# Patient Record
Sex: MALE | Race: ASIAN | NOT HISPANIC OR LATINO | URBAN - METROPOLITAN AREA
[De-identification: names, ages, dates, MRNs, and addresses within clinical notes are randomized per-mention and may not be internally consistent; named-entity substitution may affect disease eponyms.]

---

## 2017-03-01 RX ORDER — CHLORHEXIDINE GLUCONATE 213 G/1000ML
1 SOLUTION TOPICAL ONCE
Qty: 0 | Refills: 0 | Status: DISCONTINUED | OUTPATIENT
Start: 2017-03-02 | End: 2017-03-03

## 2017-03-01 NOTE — H&P ADULT - NSHPREVIEWOFSYSTEMS_GEN_ALL_CORE
GENERAL, CONSTITUTIONAL : denies recent weight loss, Fever, Chills  EYES, VISION: denies changes in vision   EARS, NOSE, THROAT: denies hearing loss  HEART, CARDIOVASCULAR: +chest pain/HEREDIA, denies arrhythmia, palpitations,  LE edema, claudication.   RESPIRATORY: +HEREDIA, Denies cough or wheezing  GASTROINTESTINAL: Endorses intermittent constipation. Denies abdominal pain, heartburn, bloody stool.   GENITOURINARY: Denies frequent urination, urgency  MUSCULOSKELETAL denies joint pain or swelling, restricted motion, musculoskeletal pain.   SKIN & INTEGUMENTARY Denies rashes, sores, blisters, blisters, growths.  NEUROLOGICAL: Denies numbness or tingling sensations, sensation loss, burning.   PSYCHIATRIC: Denies nervousness, anxiety, depression  ENDOCRINE Denies heat or cold intolerance, excessive thirst  HEMATOLOGIC/LYMPHATIC: Denies abnormal bleeding, bleeding of any kind

## 2017-03-01 NOTE — H&P ADULT - NSHPPHYSICALEXAM_GEN_ALL_CORE
HR: --77  BP: --115/78  RR: --18  SpO2: --95%  Temp:  Height: 5' 4"  Wt(kg): --144lbs     Appearance: Normal	  HEENT:   Normal oral mucosa, PERRL, EOMI	  Neck: Supple, No JVD; No Carotid Bruit and 2+ pulses B/L  Cardiovascular: Normal S1 S2, No JVD, No murmurs  Respiratory: Lungs clear to auscultation No Rales, Rhonchi, Wheezing	  Gastrointestinal:  + BS X 4Q, Soft, Non-tender, non distended  	  Skin: No rashes, No ecchymoses, No cyanosis  Extremities: Normal range of motion, No clubbing, cyanosis or edema  Vascular: Femoral pulses 2+ b/l without bruit, DP 1+ b/l, PT 1+ b/l  Neurologic: Non-focal  Psychiatry: A & O x 3, Mood & affect appropriate

## 2017-03-01 NOTE — H&P ADULT - ASSESSMENT
56 yr old M current smoker, with FHx of heart disease, PMHx of dyslipidemia, DM, MI in 2004 s/p PCI@ [facility] (report unavailable) who presents for recommended Cardiac Cath with possible intervention if clinically indicated secondary to CCS Anginal Class 3 Equivalent Symptoms in the setting of an abnormal Stress Echo.       ASA: III and Mallampati: III  ***OF NOTE: 56 yr old M current smoker, with FHx of heart disease, PMHx of dyslipidemia, DM, MI in 2004 s/p PCI@ [facility] (report unavailable) who presents for recommended Cardiac Cath with possible intervention if clinically indicated secondary to CCS Anginal Class 3 Equivalent Symptoms in the setting of an abnormal Stress Echo.       ASA: III and Mallampati: III  ***OF NOTE: Pt was loaded with ASA 325mg PO X 1 and Plavix 600mg PO X 1  prior to procedure.

## 2017-03-01 NOTE — H&P ADULT - HISTORY OF PRESENT ILLNESS
**will bring in medications, please confirm dosages**      56 yr old M current smoker, with FHx of heart disease, PMHx of dyslipidemia, DM, MI in 2004 s/p PCI@ [facility] (report unavailable) who initially presented to cardiologist c/o exertional chest pain x several months. Patient describes it as being midsternal chest pressure with radiation to jaw, 6/10 in severity. Symptoms typically brought on by walking >2 blocks at a fast pace, improved within a few minutes of rest. Patient denies any SOB, palpitations, dizziness, recent PND, orthopnea or LE edema.  Stress Echocardiogram on 2/4/17 revealed abnormal LV contractility and augmentation involving the go-septal walls of the lV with overt evidence of ischemia to the HR achieved. EF:50%. In light of patients risk factors, CCS Angina Class III equivalent symptoms and abnormal Stress echo, patient now presents for recommended cardiac catheterization with possible intervention. 56 yr old M current smoker, with FHx of heart disease, PMHx of dyslipidemia, DM, MI in 2004 s/p PCI@ [facility] (report unavailable) who initially presented to cardiologist c/o exertional chest pain x several months. Patient describes it as being midsternal chest pressure with radiation to jaw, 6/10 in severity. Symptoms typically brought on by walking >2 blocks at a fast pace, improved within a few minutes of rest. Patient denies any SOB, palpitations, dizziness, recent PND, orthopnea or LE edema.  Stress Echocardiogram on 2/4/17 revealed abnormal LV contractility and augmentation involving the go-septal walls of the lV with overt evidence of ischemia to the HR achieved. EF:50%. In light of patients risk factors, CCS Angina Class III equivalent symptoms and abnormal Stress echo, patient now presents for recommended cardiac catheterization with possible intervention.

## 2017-03-02 ENCOUNTER — INPATIENT (INPATIENT)
Facility: HOSPITAL | Age: 57
LOS: 0 days | Discharge: HOME | DRG: 247 | End: 2017-03-03
Attending: INTERNAL MEDICINE | Admitting: INTERNAL MEDICINE
Payer: COMMERCIAL

## 2017-03-02 VITALS
RESPIRATION RATE: 16 BRPM | HEART RATE: 68 BPM | DIASTOLIC BLOOD PRESSURE: 88 MMHG | SYSTOLIC BLOOD PRESSURE: 128 MMHG | OXYGEN SATURATION: 98 %

## 2017-03-02 DIAGNOSIS — Z98.890 OTHER SPECIFIED POSTPROCEDURAL STATES: Chronic | ICD-10-CM

## 2017-03-02 LAB
ANION GAP SERPL CALC-SCNC: 8 MMOL/L — LOW (ref 9–16)
APTT BLD: 34.4 SEC — SIGNIFICANT CHANGE UP (ref 27.5–37.4)
BASOPHILS NFR BLD AUTO: 1.6 % — SIGNIFICANT CHANGE UP (ref 0–2)
BUN SERPL-MCNC: 13 MG/DL — SIGNIFICANT CHANGE UP (ref 7–23)
CALCIUM SERPL-MCNC: 9.2 MG/DL — SIGNIFICANT CHANGE UP (ref 8.5–10.5)
CHLORIDE SERPL-SCNC: 106 MMOL/L — SIGNIFICANT CHANGE UP (ref 96–108)
CHOLEST SERPL-MCNC: 169 MG/DL — SIGNIFICANT CHANGE UP
CK MB CFR SERPL CALC: <1 NG/ML — SIGNIFICANT CHANGE UP (ref 0.5–3.6)
CK SERPL-CCNC: 110 U/L — SIGNIFICANT CHANGE UP (ref 39–308)
CO2 SERPL-SCNC: 25 MMOL/L — SIGNIFICANT CHANGE UP (ref 22–31)
CREAT SERPL-MCNC: 1.01 MG/DL — SIGNIFICANT CHANGE UP (ref 0.5–1.3)
CRP SERPL-MCNC: <0.29 MG/DL — SIGNIFICANT CHANGE UP
EOSINOPHIL NFR BLD AUTO: 5.5 % — SIGNIFICANT CHANGE UP (ref 0–6)
GLUCOSE SERPL-MCNC: 215 MG/DL — HIGH (ref 70–99)
HBA1C BLD-MCNC: 7.7 % — HIGH (ref 4.8–5.6)
HCT VFR BLD CALC: 46.5 % — SIGNIFICANT CHANGE UP (ref 39–50)
HDLC SERPL-MCNC: 33 MG/DL — LOW
HGB BLD-MCNC: 16.2 G/DL — SIGNIFICANT CHANGE UP (ref 13–17)
INR BLD: 1.12 — SIGNIFICANT CHANGE UP (ref 0.88–1.16)
LIPID PNL WITH DIRECT LDL SERPL: 92 MG/DL — SIGNIFICANT CHANGE UP
LYMPHOCYTES # BLD AUTO: 32.9 % — SIGNIFICANT CHANGE UP (ref 13–44)
MCHC RBC-ENTMCNC: 29.1 PG — SIGNIFICANT CHANGE UP (ref 27–34)
MCHC RBC-ENTMCNC: 34.8 G/DL — SIGNIFICANT CHANGE UP (ref 32–36)
MCV RBC AUTO: 83.5 FL — SIGNIFICANT CHANGE UP (ref 80–100)
MONOCYTES NFR BLD AUTO: 7.5 % — SIGNIFICANT CHANGE UP (ref 2–14)
NEUTROPHILS NFR BLD AUTO: 52.5 % — SIGNIFICANT CHANGE UP (ref 43–77)
PLATELET # BLD AUTO: 207 K/UL — SIGNIFICANT CHANGE UP (ref 150–400)
POTASSIUM SERPL-MCNC: 4.1 MMOL/L — SIGNIFICANT CHANGE UP (ref 3.5–5.3)
POTASSIUM SERPL-SCNC: 4.1 MMOL/L — SIGNIFICANT CHANGE UP (ref 3.5–5.3)
PROTHROM AB SERPL-ACNC: 12.4 SEC — SIGNIFICANT CHANGE UP (ref 10–13.1)
RBC # BLD: 5.57 M/UL — SIGNIFICANT CHANGE UP (ref 4.2–5.8)
RBC # FLD: 12.8 % — SIGNIFICANT CHANGE UP (ref 10.3–16.9)
SODIUM SERPL-SCNC: 139 MMOL/L — SIGNIFICANT CHANGE UP (ref 135–145)
TOTAL CHOLESTEROL/HDL RATIO MEASUREMENT: 5.1 RATIO — HIGH
TRIGL SERPL-MCNC: 222 MG/DL — HIGH
WBC # BLD: 8.5 K/UL — SIGNIFICANT CHANGE UP (ref 3.8–10.5)
WBC # FLD AUTO: 8.5 K/UL — SIGNIFICANT CHANGE UP (ref 3.8–10.5)

## 2017-03-02 PROCEDURE — 92921: CPT | Mod: RC

## 2017-03-02 PROCEDURE — 93010 ELECTROCARDIOGRAM REPORT: CPT

## 2017-03-02 PROCEDURE — 92928 PRQ TCAT PLMT NTRAC ST 1 LES: CPT | Mod: RC

## 2017-03-02 PROCEDURE — 93454 CORONARY ARTERY ANGIO S&I: CPT | Mod: 26,XU

## 2017-03-02 PROCEDURE — 92929: CPT | Mod: RC

## 2017-03-02 RX ORDER — CLOPIDOGREL BISULFATE 75 MG/1
75 TABLET, FILM COATED ORAL DAILY
Qty: 0 | Refills: 0 | Status: DISCONTINUED | OUTPATIENT
Start: 2017-03-03 | End: 2017-03-03

## 2017-03-02 RX ORDER — ASPIRIN/CALCIUM CARB/MAGNESIUM 324 MG
81 TABLET ORAL DAILY
Qty: 0 | Refills: 0 | Status: DISCONTINUED | OUTPATIENT
Start: 2017-03-03 | End: 2017-03-03

## 2017-03-02 RX ORDER — DEXTROSE 50 % IN WATER 50 %
1 SYRINGE (ML) INTRAVENOUS ONCE
Qty: 0 | Refills: 0 | Status: DISCONTINUED | OUTPATIENT
Start: 2017-03-02 | End: 2017-03-03

## 2017-03-02 RX ORDER — CLOPIDOGREL BISULFATE 75 MG/1
600 TABLET, FILM COATED ORAL ONCE
Qty: 0 | Refills: 0 | Status: COMPLETED | OUTPATIENT
Start: 2017-03-02 | End: 2017-03-02

## 2017-03-02 RX ORDER — DEXTROSE 50 % IN WATER 50 %
25 SYRINGE (ML) INTRAVENOUS ONCE
Qty: 0 | Refills: 0 | Status: DISCONTINUED | OUTPATIENT
Start: 2017-03-02 | End: 2017-03-03

## 2017-03-02 RX ORDER — DEXTROSE 50 % IN WATER 50 %
25 SYRINGE (ML) INTRAVENOUS ONCE
Qty: 0 | Refills: 0 | Status: DISCONTINUED | OUTPATIENT
Start: 2017-03-02 | End: 2017-03-02

## 2017-03-02 RX ORDER — INSULIN LISPRO 100/ML
VIAL (ML) SUBCUTANEOUS ONCE
Qty: 0 | Refills: 0 | Status: DISCONTINUED | OUTPATIENT
Start: 2017-03-02 | End: 2017-03-02

## 2017-03-02 RX ORDER — INSULIN LISPRO 100/ML
VIAL (ML) SUBCUTANEOUS
Qty: 0 | Refills: 0 | Status: DISCONTINUED | OUTPATIENT
Start: 2017-03-02 | End: 2017-03-03

## 2017-03-02 RX ORDER — SODIUM CHLORIDE 9 MG/ML
1000 INJECTION, SOLUTION INTRAVENOUS
Qty: 0 | Refills: 0 | Status: DISCONTINUED | OUTPATIENT
Start: 2017-03-02 | End: 2017-03-02

## 2017-03-02 RX ORDER — GLUCAGON INJECTION, SOLUTION 0.5 MG/.1ML
1 INJECTION, SOLUTION SUBCUTANEOUS ONCE
Qty: 0 | Refills: 0 | Status: DISCONTINUED | OUTPATIENT
Start: 2017-03-02 | End: 2017-03-03

## 2017-03-02 RX ORDER — GLUCAGON INJECTION, SOLUTION 0.5 MG/.1ML
1 INJECTION, SOLUTION SUBCUTANEOUS ONCE
Qty: 0 | Refills: 0 | Status: DISCONTINUED | OUTPATIENT
Start: 2017-03-02 | End: 2017-03-02

## 2017-03-02 RX ORDER — SODIUM CHLORIDE 9 MG/ML
500 INJECTION INTRAMUSCULAR; INTRAVENOUS; SUBCUTANEOUS
Qty: 0 | Refills: 0 | Status: DISCONTINUED | OUTPATIENT
Start: 2017-03-02 | End: 2017-03-02

## 2017-03-02 RX ORDER — SIMVASTATIN 20 MG/1
1 TABLET, FILM COATED ORAL
Qty: 0 | Refills: 0 | COMMUNITY

## 2017-03-02 RX ORDER — SODIUM CHLORIDE 9 MG/ML
1000 INJECTION, SOLUTION INTRAVENOUS
Qty: 0 | Refills: 0 | Status: DISCONTINUED | OUTPATIENT
Start: 2017-03-02 | End: 2017-03-03

## 2017-03-02 RX ORDER — DEXTROSE 50 % IN WATER 50 %
12.5 SYRINGE (ML) INTRAVENOUS ONCE
Qty: 0 | Refills: 0 | Status: DISCONTINUED | OUTPATIENT
Start: 2017-03-02 | End: 2017-03-03

## 2017-03-02 RX ORDER — ASPIRIN/CALCIUM CARB/MAGNESIUM 324 MG
325 TABLET ORAL ONCE
Qty: 0 | Refills: 0 | Status: COMPLETED | OUTPATIENT
Start: 2017-03-02 | End: 2017-03-02

## 2017-03-02 RX ORDER — SIMVASTATIN 20 MG/1
40 TABLET, FILM COATED ORAL DAILY
Qty: 0 | Refills: 0 | Status: DISCONTINUED | OUTPATIENT
Start: 2017-03-02 | End: 2017-03-02

## 2017-03-02 RX ORDER — DEXTROSE 50 % IN WATER 50 %
12.5 SYRINGE (ML) INTRAVENOUS ONCE
Qty: 0 | Refills: 0 | Status: DISCONTINUED | OUTPATIENT
Start: 2017-03-02 | End: 2017-03-02

## 2017-03-02 RX ORDER — DEXTROSE 50 % IN WATER 50 %
1 SYRINGE (ML) INTRAVENOUS ONCE
Qty: 0 | Refills: 0 | Status: DISCONTINUED | OUTPATIENT
Start: 2017-03-02 | End: 2017-03-02

## 2017-03-02 RX ADMIN — SODIUM CHLORIDE 75 MILLILITER(S): 9 INJECTION INTRAMUSCULAR; INTRAVENOUS; SUBCUTANEOUS at 10:33

## 2017-03-02 RX ADMIN — Medication 325 MILLIGRAM(S): at 10:29

## 2017-03-02 RX ADMIN — Medication 1: at 21:19

## 2017-03-02 RX ADMIN — CLOPIDOGREL BISULFATE 600 MILLIGRAM(S): 75 TABLET, FILM COATED ORAL at 10:29

## 2017-03-02 NOTE — CONSULT NOTE ADULT - SUBJECTIVE AND OBJECTIVE BOX
CHIEF COMPLAINT: CAD    HISTORY OF PRESENT ILLNESS:  56 yr old M current smoker, with FHx of heart disease, PMHx of dyslipidemia, DM, MI in 2004 s/p PCI@ [facility] (report unavailable) who initially presented to cardiologist c/o exertional chest pain x several months. Patient describes it as being midsternal chest pressure with radiation to jaw, 6/10 in severity. Symptoms typically brought on by walking >2 blocks at a fast pace, improved within a few minutes of rest. Patient denies any SOB, palpitations, dizziness, recent PND, orthopnea or LE edema.  On 3/2/17 patient had a PCI with GRIS x 2 to his d/RCA/RPDA.     PAST MEDICAL & SURGICAL HISTORY:  Coronary artery disease  Diabetes  Dyslipidemia  S/P right knee arthroscopy  History of percutaneous coronary intervention    FAMILY HISTORY:   Family history of heart disease (Father)    ALLERGIES: NKDA    HOME MEDICATIONS:  · 	Aspirin Enteric Coated 325 mg oral delayed release tablet: Last Dose Taken:  , 1 tab(s) orally once a day  · 	metFORMIN 1000 mg oral tablet, extended release: Last Dose Taken:  , 1 tab(s) orally once a day  · 	simvastatin 40 mg oral tablet: Last Dose Taken:  , 1 tab(s) orally once a day (at bedtime)    REVIEW OF SYSTEMS:  CONSTITUTIONAL: No fever, weight loss, or fatigue  NEUROLOGICAL: No headaches, memory loss, loss of strength, numbness, or tremors  PSYCHIATRIC: No depression, anxiety, mood swings, or difficulty sleeping  RESPIRATORY: No cough, wheezing, chills or hemoptysis; No Shortness of Breath  CARDIOVASCULAR: No chest pain, palpitations, passing out, dizziness, or leg swelling  GASTROINTESTINAL: No abdominal or epigastric pain. No nausea, vomiting, or hematemesis; No diarrhea or constipation. No melena or hematochezia.  SKIN: No itching, burning, rashes, or lesions   MUSCULOSKELETAL: right wrist soreness over radial band	    PHYSICAL EXAM:  Height (cm): 162.5 (03-02 @ 10:27)  Weight (kg): 65.3 (03-02 @ 10:27)  BMI (kg/m2): 24.7 (03-02 @ 10:27)  HR: --77  BP: --115/78  RR: --18  SpO2: --95%    Appearance: Normal	  Neurologic: Non-focal  Psychiatric: AxOx3, normal mood and affect  HEENT:   Normal oral mucosa, PERRL, EOMI	  Lymphatic: No lymphadenopathy  Cardiovascular: Normal S1 S2, No JVD, No murmurs, No edema  Respiratory: Lungs clear to auscultation	  Gastrointestinal:  Soft, Non-tender, + BS	  Skin: No rashes, No ecchymoses, No cyanosis	  Extremities: Normal range of motion, No clubbing, cyanosis or edema  Vascular: Peripheral pulses palpable 2+ bilaterally  	  LABS:                        16.2   8.5   )-----------( 207      ( 02 Mar 2017 09:07 )             46.5     02 Mar 2017 09:07    139    |  106    |  13     ----------------------------<  215    4.1     |  25     |  1.01     Ca    9.2        02 Mar 2017 09:07        Hemoglobin A1C, Whole Blood: 7.7 % (03-02 @ 09:07)      Cholesterol, Serum: 169 mg/dL (03-02 @ 09:07)  HDL Cholesterol, Serum: 33 mg/dL (03-02 @ 09:07)  Triglycerides, Serum: 222 mg/dL (03-02 @ 09:07)  Direct LDL: 92 mg/dL (03-02 @ 09:07)    C-Reactive Protein, Serum: <0.29 mg/dL (03-02 @ 09:07)      10 "S" ASSESSMENT PLAN: SMOKING, SITTING, SUGAR, SALT, SOME FATS, SOCIAL, SLEEP, SIGNS, AND MEDS:  Tobacco usage: Current every day smoker. He has smoked 1/2 ppd for about 30 years. 15 pack year history.   Stress: Rates his stress as fairly well-controlled.   ETOH: He occasionally drinks one beer.   Caffeine: He usually drinks 2-3 cups of tea per day and several cups of coffee. He adds whole milk.   Hormone Replacement: Denies.   Sleep Disorder: + snoring. He has been tired lately. He reports having had a home sleep study but never followed up on the results.   Inflammatory Condition: Denies.   Activity Level: He is active on his job working for [employer], but he does not exercise outside of work.   Current Diet: Breakfast) whole wheat bread, crackers with peanut butter or eggs and fruit. Lunch) often the same as breakfast or just fruit. Dinner) Rice and chicken jin or spaghetti or other pasta and sauce, or fish jin with naan or roti. Other) fruit for dessert and snacks  Heart Failure: Denies.   Myopathy with Statins: Denies.   GI/ Issues: + GERD, taking Nexium, he denies having seen a GI specialist.     ASSESSMENT/RECOMMENDATIONS: 	  Summary: 56 yr old M current smoker, with FHx of heart disease, PMHx of dyslipidemia, DM, MI in 2004 s/p PCI@ [facility] (report unavailable) who initially presented to cardiologist c/o exertional chest pain x several months. Patient describes it as being midsternal chest pressure with radiation to jaw, 6/10 in severity. Symptoms typically brought on by walking >2 blocks at a fast pace, improved within a few minutes of rest. Patient denies any SOB, palpitations, dizziness, recent PND, orthopnea or LE edema.  On 3/2/17 patient had a PCI with GRIS x 2 to his d/RCA/RPDA.     RECOMMENDATIONS:   Anti-platelet Therapy: DAPT per interventionalist recommendation.   Lipid Therapy: Consider switching the patient from a moderate-dose statin (simvastatin 40mg/d) to a high-dose statin with Lipitor 40mg/d.   Beta Blocker Therapy: Per your discretion.   ACE/ARB Therapy: Consider ACE/ARB therapy for renal protection in this diabetic patient.    Diet: Low-sodium, low-carbohydrate, Mediterranean diet. Written instruction on the Mediterranean diet was provided.   Exercise: 30-45 minutes most days of the week once cleared to do so by their referring cardiologist.   Smoking: Smoking cessation encouraged. We discussed cessation methods. He was given written materials and resources as well.   Stress Management: Instruction on mindfulness meditation was provided.   Sleep: Clinical evidence is suspicious for sleep apnea. Follow-up on home sleep test that was completed is suggested.   Other: Referral to gastroenterology might benefit this patient with h/o GERD and PPI use.     Thank you for the opportunity to see this patient. Please feel free to contact [facility] if there are any questions, or if you feel that your patient would benefit from continued follow-up visits with the Program.

## 2017-03-03 ENCOUNTER — TRANSCRIPTION ENCOUNTER (OUTPATIENT)
Age: 57
End: 2017-03-03

## 2017-03-03 VITALS — TEMPERATURE: 97 F

## 2017-03-03 LAB
ANION GAP SERPL CALC-SCNC: 7 MMOL/L — LOW (ref 9–16)
BUN SERPL-MCNC: 12 MG/DL — SIGNIFICANT CHANGE UP (ref 7–23)
CALCIUM SERPL-MCNC: 8.4 MG/DL — LOW (ref 8.5–10.5)
CHLORIDE SERPL-SCNC: 108 MMOL/L — SIGNIFICANT CHANGE UP (ref 96–108)
CO2 SERPL-SCNC: 24 MMOL/L — SIGNIFICANT CHANGE UP (ref 22–31)
CREAT SERPL-MCNC: 0.87 MG/DL — SIGNIFICANT CHANGE UP (ref 0.5–1.3)
GLUCOSE SERPL-MCNC: 148 MG/DL — HIGH (ref 70–99)
HCT VFR BLD CALC: 43.5 % — SIGNIFICANT CHANGE UP (ref 39–50)
HGB BLD-MCNC: 15 G/DL — SIGNIFICANT CHANGE UP (ref 13–17)
MAGNESIUM SERPL-MCNC: 2.1 MG/DL — SIGNIFICANT CHANGE UP (ref 1.6–2.4)
MCHC RBC-ENTMCNC: 29.5 PG — SIGNIFICANT CHANGE UP (ref 27–34)
MCHC RBC-ENTMCNC: 34.5 G/DL — SIGNIFICANT CHANGE UP (ref 32–36)
MCV RBC AUTO: 85.5 FL — SIGNIFICANT CHANGE UP (ref 80–100)
PLATELET # BLD AUTO: 168 K/UL — SIGNIFICANT CHANGE UP (ref 150–400)
POTASSIUM SERPL-MCNC: 4.1 MMOL/L — SIGNIFICANT CHANGE UP (ref 3.5–5.3)
POTASSIUM SERPL-SCNC: 4.1 MMOL/L — SIGNIFICANT CHANGE UP (ref 3.5–5.3)
RBC # BLD: 5.09 M/UL — SIGNIFICANT CHANGE UP (ref 4.2–5.8)
RBC # FLD: 13 % — SIGNIFICANT CHANGE UP (ref 10.3–16.9)
SODIUM SERPL-SCNC: 139 MMOL/L — SIGNIFICANT CHANGE UP (ref 135–145)
WBC # BLD: 8.5 K/UL — SIGNIFICANT CHANGE UP (ref 3.8–10.5)
WBC # FLD AUTO: 8.5 K/UL — SIGNIFICANT CHANGE UP (ref 3.8–10.5)

## 2017-03-03 PROCEDURE — 85610 PROTHROMBIN TIME: CPT

## 2017-03-03 PROCEDURE — 85730 THROMBOPLASTIN TIME PARTIAL: CPT

## 2017-03-03 PROCEDURE — 80048 BASIC METABOLIC PNL TOTAL CA: CPT

## 2017-03-03 PROCEDURE — 82553 CREATINE MB FRACTION: CPT

## 2017-03-03 PROCEDURE — C1725: CPT

## 2017-03-03 PROCEDURE — C1769: CPT

## 2017-03-03 PROCEDURE — 85025 COMPLETE CBC W/AUTO DIFF WBC: CPT

## 2017-03-03 PROCEDURE — C1874: CPT

## 2017-03-03 PROCEDURE — 83036 HEMOGLOBIN GLYCOSYLATED A1C: CPT

## 2017-03-03 PROCEDURE — 36415 COLL VENOUS BLD VENIPUNCTURE: CPT

## 2017-03-03 PROCEDURE — 85027 COMPLETE CBC AUTOMATED: CPT

## 2017-03-03 PROCEDURE — 86140 C-REACTIVE PROTEIN: CPT

## 2017-03-03 PROCEDURE — C1887: CPT

## 2017-03-03 PROCEDURE — 82550 ASSAY OF CK (CPK): CPT

## 2017-03-03 PROCEDURE — 83735 ASSAY OF MAGNESIUM: CPT

## 2017-03-03 PROCEDURE — 80061 LIPID PANEL: CPT

## 2017-03-03 PROCEDURE — 93005 ELECTROCARDIOGRAM TRACING: CPT

## 2017-03-03 RX ORDER — ASPIRIN/CALCIUM CARB/MAGNESIUM 324 MG
1 TABLET ORAL
Qty: 30 | Refills: 11 | OUTPATIENT
Start: 2017-03-03 | End: 2018-02-25

## 2017-03-03 RX ORDER — ASPIRIN/CALCIUM CARB/MAGNESIUM 324 MG
1 TABLET ORAL
Qty: 0 | Refills: 0 | COMMUNITY

## 2017-03-03 RX ORDER — CLOPIDOGREL BISULFATE 75 MG/1
1 TABLET, FILM COATED ORAL
Qty: 30 | Refills: 11 | OUTPATIENT
Start: 2017-03-03 | End: 2018-02-25

## 2017-03-03 RX ORDER — SIMVASTATIN 20 MG/1
1 TABLET, FILM COATED ORAL
Qty: 0 | Refills: 0 | COMMUNITY

## 2017-03-03 RX ORDER — ATORVASTATIN CALCIUM 80 MG/1
1 TABLET, FILM COATED ORAL
Qty: 30 | Refills: 3 | OUTPATIENT
Start: 2017-03-03 | End: 2017-06-30

## 2017-03-03 RX ORDER — ATORVASTATIN CALCIUM 80 MG/1
40 TABLET, FILM COATED ORAL AT BEDTIME
Qty: 0 | Refills: 0 | Status: DISCONTINUED | OUTPATIENT
Start: 2017-03-03 | End: 2017-03-03

## 2017-03-03 RX ADMIN — CLOPIDOGREL BISULFATE 75 MILLIGRAM(S): 75 TABLET, FILM COATED ORAL at 12:31

## 2017-03-03 RX ADMIN — Medication 2: at 08:35

## 2017-03-03 RX ADMIN — Medication 81 MILLIGRAM(S): at 12:31

## 2017-03-03 RX ADMIN — Medication 1: at 12:36

## 2017-03-03 NOTE — DISCHARGE NOTE ADULT - PLAN OF CARE
The procedure was done through your right wrist. Please monitor for any signs of bleeding, blood collection under the skin, extreme pain or any signs of infection. If you experience any of these symptoms, please call your doctor or go to the nearest ER.   You can shower but no baths for 5 days.   No strenuous activities or heavy lifting for at least 5 days. You received two stents to two of your heart arteries. Continue Aspirin EC 81mg daily and Plavix 75mg once a daily. DO NOT STOP THESE MEDICATIONS UNLESS OTHERWISE INSTRUCTED BY YOUR CARDIOLOGIST. IF YOU STOP ANY OF THESE MEDICATIONS your stent can close and you can have a heart attack. STOP SIMVASTATIN. Start Atorvastatin (LIPITOR) 40mg once a day at bedtime. Check your liver function in 1 month.   Check your lipid panel (don't eat before test) in 1 month. STOP AND RESTART METFORMIN ON SUNDAY MARCH 5TH because you received contrast dye during the procedure. Monitor your fingerstick. Avoid concentrated sweets.   If your fingerstick is greater than 200 please call your doctor immediately or go to the nearest ER.

## 2017-03-03 NOTE — DISCHARGE NOTE ADULT - CARE PROVIDER_API CALL
Diane Jhaveri), Cardiovascular Disease; Internal Medicine  [address] [place] [address]  Phone: [phone]  Fax: [phone]

## 2017-03-03 NOTE — DISCHARGE NOTE ADULT - HOSPITAL COURSE
56 yr old M current smoker, with FHx of heart disease, PMHx of dyslipidemia, DM, MI in 2004 s/p PCI@ [facility] (report unavailable) who initially presented to cardiologist c/o exertional chest pain x several months. Stress Echocardiogram on 2/4/17 revealed abnormal LV contractility and augmentation involving the go-septal walls of the lV with overt evidence of ischemia to the HR achieved. EF:50%. In light of patients risk factors, CCS Angina Class III equivalent symptoms and abnormal Stress echo, patient presented to [facility] for recommended cardiac catheterization with possible intervention. Pt s/p PCI on 3/2 and received GRIS x 2 to distal RCA and RPDA and PTCA of RPLS. Revealed prox RCA 40%, distal RCA patent stent followed by 90% lesion, RPLS two tandem 70% lesion, RPDA 80% lesion, LM luminal irregularities, LCx luminal irregularities, LAD luminal irregularities, D1 medium to large 90% lesion. Recommended to return for staged PCI of D1 in 4-6 weeks. Pt to con't ASA/Plavix. Switched Zocor to lipitor 40mg QHS. Right wrist: no hematoma, NT, 2+ radial pulse. Labs reviewed and case discussed w/ Dr. Mendoza who deemed pt stable for discharge. Pt will f/u with Dr. Jhaveri in 1-2 weeks. Pt was given all discharge instructions and signed all paperwork.

## 2017-03-03 NOTE — DISCHARGE NOTE ADULT - MEDICATION SUMMARY - MEDICATIONS TO CHANGE
I will SWITCH the dose or number of times a day I take the medications listed below when I get home from the hospital:    Aspirin Enteric Coated 325 mg oral delayed release tablet  -- 1 tab(s) by mouth once a day

## 2017-03-03 NOTE — DISCHARGE NOTE ADULT - MEDICATION SUMMARY - MEDICATIONS TO TAKE
I will START or STAY ON the medications listed below when I get home from the hospital:    aspirin 81 mg oral delayed release tablet  -- 1 tab(s) by mouth once a day  -- Indication: For Coronary artery disease    metFORMIN 1000 mg oral tablet, extended release  -- 1 tab(s) by mouth once a day  -- Indication: For Diabetes. STOP AND RESTART ON SUNDAY MARCH 5TH     atorvastatin 40 mg oral tablet  -- 1 tab(s) by mouth once a day (at bedtime)  -- Indication: For High cholesterol     clopidogrel 75 mg oral tablet  -- 1 tab(s) by mouth once a day  -- Indication: For Coronary artery disease

## 2017-03-03 NOTE — DISCHARGE NOTE ADULT - CARE PLAN
Principal Discharge DX:	Coronary artery disease  Goal:	You received two stents to two of your heart arteries. Continue Aspirin EC 81mg daily and Plavix 75mg once a daily. DO NOT STOP THESE MEDICATIONS UNLESS OTHERWISE INSTRUCTED BY YOUR CARDIOLOGIST. IF YOU STOP ANY OF THESE MEDICATIONS your stent can close and you can have a heart attack.  Instructions for follow-up, activity and diet:	The procedure was done through your right wrist. Please monitor for any signs of bleeding, blood collection under the skin, extreme pain or any signs of infection. If you experience any of these symptoms, please call your doctor or go to the nearest ER.   You can shower but no baths for 5 days.   No strenuous activities or heavy lifting for at least 5 days.  Secondary Diagnosis:	Dyslipidemia  Goal:	STOP SIMVASTATIN. Start Atorvastatin (LIPITOR) 40mg once a day at bedtime.  Instructions for follow-up, activity and diet:	Check your liver function in 1 month.   Check your lipid panel (don't eat before test) in 1 month.  Secondary Diagnosis:	Diabetes  Goal:	STOP AND RESTART METFORMIN ON SUNDAY MARCH 5TH because you received contrast dye during the procedure. Monitor your fingerstick.  Instructions for follow-up, activity and diet:	Avoid concentrated sweets.   If your fingerstick is greater than 200 please call your doctor immediately or go to the nearest ER.

## 2017-03-03 NOTE — DISCHARGE NOTE ADULT - PATIENT PORTAL LINK FT
“You can access the [facility] Patient Portal, offered by [facility], by registering with the following website: [email or web address]”

## 2017-03-06 DIAGNOSIS — Z95.5 PRESENCE OF CORONARY ANGIOPLASTY IMPLANT AND GRAFT: ICD-10-CM

## 2017-03-06 DIAGNOSIS — Z28.21 IMMUNIZATION NOT CARRIED OUT BECAUSE OF PATIENT REFUSAL: ICD-10-CM

## 2017-03-06 DIAGNOSIS — E78.5 HYPERLIPIDEMIA, UNSPECIFIED: ICD-10-CM

## 2017-03-06 DIAGNOSIS — I25.10 ATHEROSCLEROTIC HEART DISEASE OF NATIVE CORONARY ARTERY WITHOUT ANGINA PECTORIS: ICD-10-CM

## 2017-03-06 DIAGNOSIS — F17.210 NICOTINE DEPENDENCE, CIGARETTES, UNCOMPLICATED: ICD-10-CM

## 2017-03-06 DIAGNOSIS — I25.2 OLD MYOCARDIAL INFARCTION: ICD-10-CM

## 2017-03-06 DIAGNOSIS — E11.59 TYPE 2 DIABETES MELLITUS WITH OTHER CIRCULATORY COMPLICATIONS: ICD-10-CM

## 2017-03-06 DIAGNOSIS — Z82.49 FAMILY HISTORY OF ISCHEMIC HEART DISEASE AND OTHER DISEASES OF THE CIRCULATORY SYSTEM: ICD-10-CM

## 2017-03-06 DIAGNOSIS — Z79.82 LONG TERM (CURRENT) USE OF ASPIRIN: ICD-10-CM

## 2017-03-21 PROBLEM — I25.10 ATHEROSCLEROTIC HEART DISEASE OF NATIVE CORONARY ARTERY WITHOUT ANGINA PECTORIS: Chronic | Status: ACTIVE | Noted: 2017-03-01

## 2017-03-21 PROBLEM — E11.9 TYPE 2 DIABETES MELLITUS WITHOUT COMPLICATIONS: Chronic | Status: ACTIVE | Noted: 2017-03-01

## 2017-03-21 PROBLEM — E78.5 HYPERLIPIDEMIA, UNSPECIFIED: Chronic | Status: ACTIVE | Noted: 2017-03-01

## 2017-04-04 VITALS
WEIGHT: 143.96 LBS | HEART RATE: 69 BPM | RESPIRATION RATE: 18 BRPM | DIASTOLIC BLOOD PRESSURE: 81 MMHG | SYSTOLIC BLOOD PRESSURE: 124 MMHG | TEMPERATURE: 96 F | HEIGHT: 64 IN | OXYGEN SATURATION: 99 %

## 2017-04-04 RX ORDER — CHLORHEXIDINE GLUCONATE 213 G/1000ML
1 SOLUTION TOPICAL ONCE
Qty: 0 | Refills: 0 | Status: DISCONTINUED | OUTPATIENT
Start: 2017-04-06 | End: 2017-04-21

## 2017-04-04 NOTE — H&P ADULT - ASSESSMENT
56 y.o Male, Current smoker, with FHx of heart disease, PMHx of HTN, dyslipidemia, NIDDM,  Known CAD with hx of MI in 2004 with prior PCI @ [facility] (report unavailable), s/p GRIS distal RCA, GRIS proximal RPDA, and PTCA of 1st RPLS @ [facility] on 03/02/17 who returns for Stage PCI of known residual 90% Diag 1 lesion.      ASA: III and Mallampati: III  ***OF NOTE: Pt received his dose of ASA 325mg PO X 1 and Plavix 75mg PO X 1 prior to procedure today. 56 y.o Male, Current smoker, with FHx of heart disease, PMHx of HTN, dyslipidemia, NIDDM,  Known CAD with hx of MI in 2004 with prior PCI @ [facility] (report unavailable), s/p GRIS distal RCA, GRIS proximal RPDA, and PTCA of 1st RPLS @ [facility] on 03/02/17 who returns for Stage PCI of known residual 90% Diag 1 lesion.      ASA: III and Mallampati: III  ***OF NOTE: Pt received his daily dose of ASA 325mg PO X 1 and Plavix 75mg PO X 1 prior to procedure today. 56 y.o Male, Current smoker, with FHx of heart disease, PMHx of HTN, dyslipidemia, NIDDM,  Known CAD with hx of MI in 2004 with prior PCI @ [facility] (report unavailable), s/p GRIS distal RCA, GRIS proximal RPDA, and PTCA of 1st RPLS @ [facility] on 03/02/17 who returns for Stage PCI of known residual 90% Diag 1 lesion.    ASA: III and Mallampati: III  ***OF NOTE: Pt received his daily dose of ASA 325mg PO X 1 and Plavix 75mg PO X 1 prior to procedure today.

## 2017-04-04 NOTE — H&P ADULT - HISTORY OF PRESENT ILLNESS
***PT TO BRING IN ALL MEDS     56 y.o Male, Current smoker, with FHx of heart disease, PMHx of HTN, dyslipidemia, DM,  Known CAD with hx of MI in 2004 with prior PCI @ [facility] (report unavailable), s/p GRIS distal RCA, GRIS proximal RPDA, and PTCA of 1st RPLS @ [facility] on 03/02/17 who returns for Stage PCI of known residual 90% Diag 1 lesion.  H e reports doing well since his recent PCI and reports compliance with his DAPT.  Pt initially presented to his cardiologist c/o exertional chest pain x several months. Patient described it as being midsternal chest pressure with radiation to jaw, 6/10 in severity. Symptoms typically brought on by walking >2 blocks at a fast pace, improved within a few minutes of rest. Stress Echocardiogram done on 2/4/17 revealed abnormal LV contractility and augmentation involving the go-septal walls of the LV with overt evidence of ischemia to the HR achieved. EF:50%.     In light of patients risk factors, Known CAD, pt now returns for Stage PCI of known residual Diag 1 lesion.      MOST RECENT CATH HX:  Cardiac Cath @ [facility] on 03/02/17: LM, LAD, and LCx with mild luminal irregularities, 90% Diag 1, 40% proximal RCA, distal RCA stent is widely patent.  Distal to the stent there is a 90% stenosis in the distal RCA.  1st RPLS is a large vessel with two tandem 70% lesiosn.  80% proximal RPDA>  LVEF was not assessed.  Successful GRIS distal RCA, GRIS proximal RPDA, and PTCA of 1st RPLS.  Recommended to return for stage PCI of Diag 1 lesion. ***PT TO BRING IN ALL MEDS     56 y.o Male, Current smoker, with FHx of heart disease, PMHx of HTN, dyslipidemia, NIDDM,  Known CAD with hx of MI in 2004 with prior PCI @ [facility] (report unavailable), s/p GRIS distal RCA, GRIS proximal RPDA, and PTCA of 1st RPLS @ [facility] on 03/02/17 who returns for Stage PCI of known residual 90% Diag 1 lesion.  H asim reports doing well since his recent PCI and reports compliance with his DAPT.  Pt initially presented to his cardiologist c/o exertional chest pain x several months. Patient described it as being midsternal chest pressure with radiation to jaw, 6/10 in severity. Symptoms typically brought on by walking >2 blocks at a fast pace, improved within a few minutes of rest. Stress Echocardiogram done on 2/4/17 revealed abnormal LV contractility and augmentation involving the go-septal walls of the LV with overt evidence of ischemia to the HR achieved. EF:50%.     In light of patients risk factors, Known CAD, pt now returns for Stage PCI of known residual Diag 1 lesion.      MOST RECENT CATH HX:  Cardiac Cath @ [facility] on 03/02/17: LM, LAD, and LCx with mild luminal irregularities, 90% Diag 1, 40% proximal RCA, distal RCA stent is widely patent.  Distal to the stent there is a 90% stenosis in the distal RCA.  1st RPLS is a large vessel with two tandem 70% lesiosn.  80% proximal RPDA>  LVEF was not assessed.  Successful GRIS distal RCA, GRIS proximal RPDA, and PTCA of 1st RPLS.  Recommended to return for stage PCI of Diag 1 lesion. 56 y.o Male, Current smoker, with FHx of heart disease, PMHx of HTN, dyslipidemia, NIDDM,  Known CAD with hx of MI in 2004 with prior PCI @ [facility] (report unavailable), s/p GRIS distal RCA, GRIS proximal RPDA, and PTCA of 1st RPLS @ [facility] on 03/02/17 who returns for Stage PCI of known residual 90% Diag 1 lesion.  H e reports doing well since his recent PCI and reports compliance with his DAPT.  Pt initially presented to his cardiologist c/o exertional chest pain x several months. Patient described it as being midsternal chest pressure with radiation to jaw, 6/10 in severity. Symptoms typically brought on by walking >2 blocks at a fast pace, improved within a few minutes of rest. Stress Echocardiogram done on 2/4/17 revealed abnormal LV contractility and augmentation involving the go-septal walls of the LV with overt evidence of ischemia to the HR achieved. EF:50%.     In light of patients risk factors, Known CAD, pt now returns for Stage PCI of known residual Diag 1 lesion.      MOST RECENT CATH HX:  Cardiac Cath @ [facility] on 03/02/17: LM, LAD, and LCx with mild luminal irregularities, 90% Diag 1, 40% proximal RCA, distal RCA stent is widely patent.  Distal to the stent there is a 90% stenosis in the distal RCA.  1st RPLS is a large vessel with two tandem 70% lesiosn.  80% proximal RPDA>  LVEF was not assessed.  Successful GRIS distal RCA, GRIS proximal RPDA, and PTCA of 1st RPLS.  Recommended to return for stage PCI of Diag 1 lesion.

## 2017-04-06 ENCOUNTER — OUTPATIENT (OUTPATIENT)
Dept: OUTPATIENT SERVICES | Facility: HOSPITAL | Age: 57
LOS: 1 days | Discharge: HOME | End: 2017-04-06
Payer: COMMERCIAL

## 2017-04-06 DIAGNOSIS — I25.2 OLD MYOCARDIAL INFARCTION: ICD-10-CM

## 2017-04-06 DIAGNOSIS — R94.39 ABNORMAL RESULT OF OTHER CARDIOVASCULAR FUNCTION STUDY: ICD-10-CM

## 2017-04-06 DIAGNOSIS — I10 ESSENTIAL (PRIMARY) HYPERTENSION: ICD-10-CM

## 2017-04-06 DIAGNOSIS — Z82.49 FAMILY HISTORY OF ISCHEMIC HEART DISEASE AND OTHER DISEASES OF THE CIRCULATORY SYSTEM: ICD-10-CM

## 2017-04-06 DIAGNOSIS — E78.5 HYPERLIPIDEMIA, UNSPECIFIED: ICD-10-CM

## 2017-04-06 DIAGNOSIS — E11.9 TYPE 2 DIABETES MELLITUS WITHOUT COMPLICATIONS: ICD-10-CM

## 2017-04-06 DIAGNOSIS — Z98.890 OTHER SPECIFIED POSTPROCEDURAL STATES: Chronic | ICD-10-CM

## 2017-04-06 DIAGNOSIS — I25.111 ATHEROSCLEROTIC HEART DISEASE OF NATIVE CORONARY ARTERY WITH ANGINA PECTORIS WITH DOCUMENTED SPASM: ICD-10-CM

## 2017-04-06 LAB
ALBUMIN SERPL ELPH-MCNC: 4 G/DL — SIGNIFICANT CHANGE UP (ref 3.4–5)
ALP SERPL-CCNC: 83 U/L — SIGNIFICANT CHANGE UP (ref 40–120)
ALT FLD-CCNC: 35 U/L — SIGNIFICANT CHANGE UP (ref 12–42)
ANION GAP SERPL CALC-SCNC: 9 MMOL/L — SIGNIFICANT CHANGE UP (ref 9–16)
APTT BLD: 30.2 SEC — SIGNIFICANT CHANGE UP (ref 27.5–37.4)
AST SERPL-CCNC: 16 U/L — SIGNIFICANT CHANGE UP (ref 15–37)
BASOPHILS NFR BLD AUTO: 1.1 % — SIGNIFICANT CHANGE UP (ref 0–2)
BILIRUB SERPL-MCNC: 0.4 MG/DL — SIGNIFICANT CHANGE UP (ref 0.2–1.2)
BUN SERPL-MCNC: 9 MG/DL — SIGNIFICANT CHANGE UP (ref 7–23)
CALCIUM SERPL-MCNC: 8.8 MG/DL — SIGNIFICANT CHANGE UP (ref 8.5–10.5)
CHLORIDE SERPL-SCNC: 102 MMOL/L — SIGNIFICANT CHANGE UP (ref 96–108)
CHOLEST SERPL-MCNC: 102 MG/DL — SIGNIFICANT CHANGE UP
CK MB CFR SERPL CALC: 1 NG/ML — SIGNIFICANT CHANGE UP (ref 0.5–3.6)
CO2 SERPL-SCNC: 26 MMOL/L — SIGNIFICANT CHANGE UP (ref 22–31)
CREAT SERPL-MCNC: 0.98 MG/DL — SIGNIFICANT CHANGE UP (ref 0.5–1.3)
CRP SERPL-MCNC: <0.29 MG/DL — SIGNIFICANT CHANGE UP
EOSINOPHIL NFR BLD AUTO: 9.3 % — HIGH (ref 0–6)
GLUCOSE SERPL-MCNC: 118 MG/DL — HIGH (ref 70–99)
HBA1C BLD-MCNC: 7.4 % — HIGH (ref 4.8–5.6)
HCT VFR BLD CALC: 42 % — SIGNIFICANT CHANGE UP (ref 39–50)
HDLC SERPL-MCNC: 30 MG/DL — LOW
HGB BLD-MCNC: 14.9 G/DL — SIGNIFICANT CHANGE UP (ref 13–17)
INR BLD: 1.11 — SIGNIFICANT CHANGE UP (ref 0.88–1.16)
LIPID PNL WITH DIRECT LDL SERPL: 48 MG/DL — SIGNIFICANT CHANGE UP
LYMPHOCYTES # BLD AUTO: 33.2 % — SIGNIFICANT CHANGE UP (ref 13–44)
MCHC RBC-ENTMCNC: 30 PG — SIGNIFICANT CHANGE UP (ref 27–34)
MCHC RBC-ENTMCNC: 35.5 G/DL — SIGNIFICANT CHANGE UP (ref 32–36)
MCV RBC AUTO: 84.7 FL — SIGNIFICANT CHANGE UP (ref 80–100)
MONOCYTES NFR BLD AUTO: 6.4 % — SIGNIFICANT CHANGE UP (ref 2–14)
NEUTROPHILS NFR BLD AUTO: 50 % — SIGNIFICANT CHANGE UP (ref 43–77)
PLATELET # BLD AUTO: 178 K/UL — SIGNIFICANT CHANGE UP (ref 150–400)
POTASSIUM SERPL-MCNC: 3.8 MMOL/L — SIGNIFICANT CHANGE UP (ref 3.5–5.3)
POTASSIUM SERPL-SCNC: 3.8 MMOL/L — SIGNIFICANT CHANGE UP (ref 3.5–5.3)
PROT SERPL-MCNC: 7.8 G/DL — SIGNIFICANT CHANGE UP (ref 6.4–8.2)
PROTHROM AB SERPL-ACNC: 12.4 SEC — SIGNIFICANT CHANGE UP (ref 9.8–12.7)
RBC # BLD: 4.96 M/UL — SIGNIFICANT CHANGE UP (ref 4.2–5.8)
RBC # FLD: 12.9 % — SIGNIFICANT CHANGE UP (ref 10.3–16.9)
SODIUM SERPL-SCNC: 137 MMOL/L — SIGNIFICANT CHANGE UP (ref 135–145)
TOTAL CHOLESTEROL/HDL RATIO MEASUREMENT: 3.4 RATIO — SIGNIFICANT CHANGE UP
TRIGL SERPL-MCNC: 118 MG/DL — SIGNIFICANT CHANGE UP
WBC # BLD: 8.7 K/UL — SIGNIFICANT CHANGE UP (ref 3.8–10.5)
WBC # FLD AUTO: 8.7 K/UL — SIGNIFICANT CHANGE UP (ref 3.8–10.5)

## 2017-04-06 PROCEDURE — 93005 ELECTROCARDIOGRAM TRACING: CPT

## 2017-04-06 PROCEDURE — 36415 COLL VENOUS BLD VENIPUNCTURE: CPT

## 2017-04-06 PROCEDURE — C1725: CPT

## 2017-04-06 PROCEDURE — 85025 COMPLETE CBC W/AUTO DIFF WBC: CPT

## 2017-04-06 PROCEDURE — C1769: CPT

## 2017-04-06 PROCEDURE — 93454 CORONARY ARTERY ANGIO S&I: CPT | Mod: 26

## 2017-04-06 PROCEDURE — 80053 COMPREHEN METABOLIC PANEL: CPT

## 2017-04-06 PROCEDURE — C1887: CPT

## 2017-04-06 PROCEDURE — 85610 PROTHROMBIN TIME: CPT

## 2017-04-06 PROCEDURE — 93010 ELECTROCARDIOGRAM REPORT: CPT

## 2017-04-06 PROCEDURE — 80061 LIPID PANEL: CPT

## 2017-04-06 PROCEDURE — 86140 C-REACTIVE PROTEIN: CPT

## 2017-04-06 PROCEDURE — 82553 CREATINE MB FRACTION: CPT

## 2017-04-06 PROCEDURE — 93454 CORONARY ARTERY ANGIO S&I: CPT

## 2017-04-06 PROCEDURE — 83036 HEMOGLOBIN GLYCOSYLATED A1C: CPT

## 2017-04-06 PROCEDURE — 82550 ASSAY OF CK (CPK): CPT

## 2017-04-06 PROCEDURE — 85730 THROMBOPLASTIN TIME PARTIAL: CPT

## 2017-04-06 RX ORDER — ASPIRIN/CALCIUM CARB/MAGNESIUM 324 MG
325 TABLET ORAL ONCE
Qty: 0 | Refills: 0 | Status: COMPLETED | OUTPATIENT
Start: 2017-04-06 | End: 2017-04-06

## 2017-04-06 RX ORDER — SODIUM CHLORIDE 9 MG/ML
500 INJECTION INTRAMUSCULAR; INTRAVENOUS; SUBCUTANEOUS
Qty: 0 | Refills: 0 | Status: DISCONTINUED | OUTPATIENT
Start: 2017-04-06 | End: 2017-04-06

## 2017-04-06 RX ORDER — SODIUM CHLORIDE 9 MG/ML
500 INJECTION INTRAMUSCULAR; INTRAVENOUS; SUBCUTANEOUS
Qty: 0 | Refills: 0 | Status: DISCONTINUED | OUTPATIENT
Start: 2017-04-06 | End: 2017-04-21

## 2017-04-06 RX ORDER — CLOPIDOGREL BISULFATE 75 MG/1
75 TABLET, FILM COATED ORAL ONCE
Qty: 0 | Refills: 0 | Status: COMPLETED | OUTPATIENT
Start: 2017-04-06 | End: 2017-04-06

## 2017-04-06 RX ORDER — METFORMIN HYDROCHLORIDE 850 MG/1
1 TABLET ORAL
Qty: 0 | Refills: 0 | COMMUNITY

## 2017-04-06 RX ORDER — INSULIN LISPRO 100/ML
VIAL (ML) SUBCUTANEOUS ONCE
Qty: 0 | Refills: 0 | Status: DISCONTINUED | OUTPATIENT
Start: 2017-04-06 | End: 2017-04-21

## 2017-04-06 RX ADMIN — Medication 325 MILLIGRAM(S): at 08:12

## 2017-04-06 RX ADMIN — SODIUM CHLORIDE 75 MILLILITER(S): 9 INJECTION INTRAMUSCULAR; INTRAVENOUS; SUBCUTANEOUS at 08:13

## 2017-04-06 RX ADMIN — CLOPIDOGREL BISULFATE 75 MILLIGRAM(S): 75 TABLET, FILM COATED ORAL at 08:13

## 2017-04-06 NOTE — PROGRESS NOTE ADULT - SUBJECTIVE AND OBJECTIVE BOX
Interventional Cardiology PA SDA Discharge Note    Patient without complaints. Ambulated and voided without difficulties    Afebrile, VSS    Ext:    	Right Radial :  no  hematoma,   no  bleeding, dressing; C/D/I      Pulses:    intact RAD to baseline     A/P:  56 y.o Male, Current smoker, with FHx of heart disease, PMHx of HTN, dyslipidemia, NIDDM,  Known CAD with hx of MI in 2004 with prior PCI @ [facility] (report unavailable), s/p GRIS distal RCA, GRIS proximal RPDA, and PTCA of 1st RPLS @ [facility] on 03/02/17 who returns for Stage PCI of known residual 90% Diag 1 lesion.  JEFF dailey reports doing well since his recent PCI and reports compliance with his DAPT.  Pt initially presented to his cardiologist c/o exertional chest pain x several months. Patient described it as being midsternal chest pressure with radiation to jaw, 6/10 in severity. Symptoms typically brought on by walking >2 blocks at a fast pace, improved within a few minutes of rest. Stress Echocardiogram done on 2/4/17 revealed abnormal LV contractility and augmentation involving the go-septal walls of the LV with overt evidence of ischemia to the HR achieved. EF:50%.  In light of patients risk factors, Known CAD, pt returned for Stage PCI of known residual Diag 1 lesion.      Pt is now s/p Diagnostic Cardiac Catheterization with Dr. Griffith 04/06/17 revealing 1V CAD LMCA <20% luminal irregularities, LAD <20% luminal irregularities, Prox RCA 20%, OM1 99% , distal RCA patent stent, RPDA patent stents. Recommended for medical therapy.      1.	Stable for discharge as per attending Dr. Griffith after bed rest, pt voids, wrist stable and 30 minutes of ambulation.  2.	Follow-up with PMD/Cardiologist Dr. Rand in 1-2 weeks  3.	Discharged forms signed and copies in chart Interventional Cardiology PA SDA Discharge Note    Patient without complaints. Ambulated and voided without difficulties    Afebrile, VSS    Ext:    	Right Radial :  no  hematoma,   no  bleeding, dressing; C/D/I      Pulses:    intact RAD to baseline     A/P:  56 y.o Male, Current smoker, with FHx of heart disease, PMHx of HTN, dyslipidemia, NIDDM,  Known CAD with hx of MI in 2004 with prior PCI @ [facility] (report unavailable), s/p GRIS distal RCA, GRIS proximal RPDA, and PTCA of 1st RPLS @ [facility] on 03/02/17 who returns for Stage PCI of known residual 90% Diag 1 lesion.  JEFF dailey reports doing well since his recent PCI and reports compliance with his DAPT.  Pt initially presented to his cardiologist c/o exertional chest pain x several months. Patient described it as being midsternal chest pressure with radiation to jaw, 6/10 in severity. Symptoms typically brought on by walking >2 blocks at a fast pace, improved within a few minutes of rest. Stress Echocardiogram done on 2/4/17 revealed abnormal LV contractility and augmentation involving the go-septal walls of the LV with overt evidence of ischemia to the HR achieved. EF:50%.  In light of patients risk factors, Known CAD, pt returned for Stage PCI of known residual Diag 1 lesion.      Pt is now s/p Diagnostic Cardiac Catheterization with Dr. Griffith 04/06/17 revealing 1V CAD LMCA <20% luminal irregularities, LAD <20% luminal irregularities, Prox RCA 20%, OM1 99% , distal RCA patent stent, RPDA patent stents. Recommended for medical therapy.      1.	Stable for discharge as per attending Dr. Griffith after bed rest, pt voids, wrist stable and 30 minutes of ambulation.  2.	Follow-up with PMD/Cardiologist Dr. Jhaveri in 1-2 weeks  3.	Discharged forms signed and copies in chart

## 2017-07-07 PROBLEM — E11.9 TYPE 2 DIABETES MELLITUS WITHOUT COMPLICATIONS: Chronic | Status: ACTIVE | Noted: 2017-04-04

## 2017-07-26 ENCOUNTER — RESULT CHARGE (OUTPATIENT)
Age: 57
End: 2017-07-26

## 2017-07-27 ENCOUNTER — APPOINTMENT (OUTPATIENT)
Dept: OTHER | Facility: CLINIC | Age: 57
End: 2017-07-27
Payer: COMMERCIAL

## 2017-07-27 VITALS
SYSTOLIC BLOOD PRESSURE: 114 MMHG | WEIGHT: 144 LBS | HEART RATE: 73 BPM | BODY MASS INDEX: 23.99 KG/M2 | OXYGEN SATURATION: 98 % | RESPIRATION RATE: 14 BRPM | HEIGHT: 65 IN | DIASTOLIC BLOOD PRESSURE: 79 MMHG

## 2017-07-27 PROCEDURE — 94010 BREATHING CAPACITY TEST: CPT

## 2017-07-27 PROCEDURE — 96150: CPT

## 2017-07-27 PROCEDURE — 99396 PREV VISIT EST AGE 40-64: CPT

## 2017-07-28 LAB
ALBUMIN SERPL ELPH-MCNC: 4.5 G/DL
ALP BLD-CCNC: 48 U/L
ALT SERPL-CCNC: 21 U/L
ANION GAP SERPL CALC-SCNC: 18 MMOL/L
APPEARANCE: CLEAR
AST SERPL-CCNC: 14 U/L
BASOPHILS # BLD AUTO: 0.08 K/UL
BASOPHILS NFR BLD AUTO: 1 %
BILIRUB SERPL-MCNC: 0.4 MG/DL
BILIRUBIN URINE: NEGATIVE
BLOOD URINE: NEGATIVE
BUN SERPL-MCNC: 13 MG/DL
CALCIUM SERPL-MCNC: 9.5 MG/DL
CHLORIDE SERPL-SCNC: 99 MMOL/L
CHOLEST SERPL-MCNC: 157 MG/DL
CHOLEST/HDLC SERPL: 4.9 RATIO
CO2 SERPL-SCNC: 25 MMOL/L
COLOR: YELLOW
CREAT SERPL-MCNC: 0.92 MG/DL
EOSINOPHIL # BLD AUTO: 0.37 K/UL
EOSINOPHIL NFR BLD AUTO: 4.6 %
GLUCOSE QUALITATIVE U: 250 MG/DL
GLUCOSE SERPL-MCNC: 205 MG/DL
HCT VFR BLD CALC: 45 %
HDLC SERPL-MCNC: 32 MG/DL
HGB BLD-MCNC: 15.1 G/DL
IMM GRANULOCYTES NFR BLD AUTO: 0.1 %
KETONES URINE: NEGATIVE
LDLC SERPL CALC-MCNC: 74 MG/DL
LEUKOCYTE ESTERASE URINE: NEGATIVE
LYMPHOCYTES # BLD AUTO: 2.63 K/UL
LYMPHOCYTES NFR BLD AUTO: 32.8 %
MAN DIFF?: NORMAL
MCHC RBC-ENTMCNC: 30.4 PG
MCHC RBC-ENTMCNC: 33.6 GM/DL
MCV RBC AUTO: 90.5 FL
MONOCYTES # BLD AUTO: 0.44 K/UL
MONOCYTES NFR BLD AUTO: 5.5 %
NEUTROPHILS # BLD AUTO: 4.48 K/UL
NEUTROPHILS NFR BLD AUTO: 56 %
NITRITE URINE: NEGATIVE
PH URINE: 5.5
PLATELET # BLD AUTO: 223 K/UL
POTASSIUM SERPL-SCNC: 4.6 MMOL/L
PROT SERPL-MCNC: 7.5 G/DL
PROTEIN URINE: NEGATIVE MG/DL
RBC # BLD: 4.97 M/UL
RBC # FLD: 13.8 %
SODIUM SERPL-SCNC: 142 MMOL/L
SPECIFIC GRAVITY URINE: 1.02
TRIGL SERPL-MCNC: 256 MG/DL
UROBILINOGEN URINE: NORMAL MG/DL
WBC # FLD AUTO: 8.01 K/UL

## 2017-08-17 ENCOUNTER — APPOINTMENT (OUTPATIENT)
Dept: GASTROENTEROLOGY | Facility: CLINIC | Age: 57
End: 2017-08-17
Payer: COMMERCIAL

## 2017-08-17 VITALS
SYSTOLIC BLOOD PRESSURE: 110 MMHG | WEIGHT: 144 LBS | HEART RATE: 81 BPM | RESPIRATION RATE: 18 BRPM | DIASTOLIC BLOOD PRESSURE: 79 MMHG | OXYGEN SATURATION: 99 % | TEMPERATURE: 98.8 F | HEIGHT: 65 IN | BODY MASS INDEX: 23.99 KG/M2

## 2017-08-17 PROCEDURE — 99203 OFFICE O/P NEW LOW 30 MIN: CPT

## 2018-06-12 NOTE — H&P ADULT - ENDOCRINE
PA reviewed discharge instructions with the patient. The patient verbalized understanding. All questions and concerns were addressed. The patient declined a wheelchair and is discharged ambulatory in the care of family members with instructions and prescriptions in hand. Pt is alert and oriented x 4. Respirations are clear and unlabored.
negative

## 2018-06-25 NOTE — H&P ADULT - REASON FOR ADMISSION
Speech Therapy Video Fluoroscopy  Video Swallow Study                                        Video Fluoroscopy Date: 6/25/2018  Referred by: Roly Lin MD  Next Referring Physician Visit: unknown  Medical Diagnosis (from order): T17.308A Choking, initial encounter   Treatment Diagnosis: Dysphagia, Pharyngoesophageal Phase  Insurance: 1. [facility] AND STATE 2. N/A    Date of Onset/Injury: date of order 6/11/18   Precautions: None  Chart reviewed: Relevant co-morbidities, allergies, tests and medications: Pt referred for outpatient swallow study due increased complaints of dysphagia.  Pt reports sticking sensations, food coming back up, and/or choking spells.  Pt presents with hoarseness, and at times, aphonic periods.  Pt did have a swallow study completed in 2014 at [facility] with mild/mod pharyngeal dysphagia characterized by impaired swallow response time with laryngeal penetration due to pharyngeal delay with vallecular pooling prior to initiation of the swallow with nectar thick liquids and thin liquids.  At the time, recommendation was to continue general diet and thin liquids using a chin tuck, small sip/bites and sit fully upright.  Prior tests:  Videofluoroscopic swallow study    SUBJECTIVE   Pt alert, pleasant, note hoarse voice with periods of aphonia.  Pain: patient reports pain is not an issue/concern    Function:   Limitations (patient reported): eating, drinking, swallowing, voice production  Prior Level(patient reported): diet: General, Thin Liquids.    Prior Treatment: no therapies in the past year for current condition. Hospitalization, home health services or skilled nursing facility in the last 30 days: No, per patient.    Safety:  Do you feel safe at home, work and/or school? yes, per patient  Fall History: (fall/near fall in past 12 months): No    Patient Goals/Concerns:  To swallow better    OBJECTIVE   Current Status:  Diet: General, Thin Liquids  Dentition:  Intact  Cognition:  Intact  Auditory Comprehension: Intact   Verbal Expression: Intact  Feeds Self: Yes  Oral Motor Screen: Assessment of facial, labial, lingual, velum and jaw function tested within functional limits    Videofluoroscopy Results:   Tested In: Lateral View  Consistency Trialed: Thin Liquid; Delivery Method: Teaspoon, Cup, Straw  Oral Phase Intact   Pharyngeal Phase Intact, Penetration   Amount of Residuals none   Location of Residuals not applicable   Amount of Penetration/Aspiration trace   Timing of Penetration/Aspiration during the swallow   Penetration Aspiration Scale 2 - Material enters the airway, remains above the vocal folds and is ejected from the airway       Consistency Trialed: [place] Thick Liquid; Delivery Method: Cup  Oral Phase Intact   Pharyngeal Phase Intact   Amount of Residuals none   Location of Residuals not applicable   Amount of Penetration/Aspiration none   Timing of Penetration/Aspiration not applicable   Penetration Aspiration Scale 1 - Material does not enter the airway     Consistency Trialed: Puree; Delivery Method: Teaspoon  Oral Phase Intact   Pharyngeal Phase Intact   Amount of Residuals none   Location of Residuals not applicable   Amount of Penetration/Aspiration none   Timing of Penetration/Aspiration not applicable   Penetration Aspiration Scale 1 - Material does not enter the airway       Consistency Trialed: Solid; Bite Size: average size bite  Oral Phase Intact   Pharyngeal Phase Intact   Amount of Residuals none   Location of Residuals not applicable   Amount of Penetration/Aspiration none   Timing of Penetration/Aspiration not applicable   Penetration Aspiration Scale 1 - Material does not enter the airway     Esophageal Phase:  bolus readily enters UES, pt to esophagram following videoswallow study    Reflux Screening Index given with a score of 32.  Score of >13 indicates a high suspicion for LPRD)      ASSESSMENT   55 year old female presents to speech therapy for completion  Stage PCI of known Diag 1 lesion of videoswallow study.  Pt presents with essentially functional oropharyngeal swallow.  Bolus formation, control, manipulation intact.  Oral clearance is good.  Swallow is timely, with adequate tongue base strength, pharyngeal stripping, epiglottic inversaion, and hyolaryngeal excursion.  Pharyngeal clearance is functional.  One instance of high, in/out laryngeal penetration noted with thin liquids.  At this time, pt safe to remain on general diet and thin liquids.  No further swallowing rehab recommended.  Do feel pt would benefit from speech therapy for voice rehab should pt agree.  Please order as appropriate per EPIC    Recommendations/Plan:  P.O. Diet Consistency: General, Thin Liquids  Strategies/Guidelines: None Required  Level of Supervision: None Required  Swallow Therapy: No  Repeat Videofluoroscopy: No  Consults: voice therapy with speech therapy    Goals:  to be obtained in one visit:   1. Patient to complete video fluoroscopy evaluation. Status: MET  2. To communicate results to pt and MD via [employer] communication.  Status: MET    PLAN   Frequency/Duration: no additional therapy indicated      The plan of care and goals were established with the patient who concurs.  Patient has been given attendance policy at time of initial evaluation.    Patient Education:  Who will be receiving education: patient  Are they ready to learn: yes  Preferred learning style: written, verbal, video, demonstration  Barriers to learning: no barriers apparent at this time   Result of initial outlined education: Verbalizes understanding and Demonstrates understanding    Therapy Daily Billing   Primary Insurance: [facility] STATE  Secondary Insurance: N/A    Evaluation Procedures:  Motion Fluoroscopy / Swallow Eval    Treatment Procedures:  No treatment codes were used on this date of service    Total Treatment Time: 60 minutes      The referring provider's electronic or written signature on the evaluation authorizes the  therapy plan of care and certifies the need for these services, furnished under this plan of care while under their care.  Electronically sent for referring provider signature

## 2018-08-15 ENCOUNTER — APPOINTMENT (OUTPATIENT)
Dept: OTHER | Facility: CLINIC | Age: 58
End: 2018-08-15
Payer: COMMERCIAL

## 2018-08-15 VITALS
BODY MASS INDEX: 24.16 KG/M2 | OXYGEN SATURATION: 98 % | DIASTOLIC BLOOD PRESSURE: 78 MMHG | HEIGHT: 65 IN | RESPIRATION RATE: 16 BRPM | SYSTOLIC BLOOD PRESSURE: 122 MMHG | WEIGHT: 145 LBS | HEART RATE: 74 BPM

## 2018-08-15 PROCEDURE — 99396 PREV VISIT EST AGE 40-64: CPT

## 2018-08-15 PROCEDURE — 96150: CPT

## 2018-08-15 PROCEDURE — 94010 BREATHING CAPACITY TEST: CPT

## 2018-08-15 RX ORDER — METFORMIN HYDROCHLORIDE 500 MG/1
500 TABLET, COATED ORAL
Refills: 0 | Status: ACTIVE | COMMUNITY
Start: 2018-08-15

## 2018-08-15 RX ORDER — TICAGRELOR 90 MG/1
90 TABLET ORAL
Refills: 0 | Status: ACTIVE | COMMUNITY
Start: 2018-08-15

## 2018-08-15 RX ORDER — ASPIRIN 81 MG/1
81 TABLET ORAL
Refills: 0 | Status: ACTIVE | COMMUNITY
Start: 2018-08-15

## 2018-08-15 RX ORDER — GLIPIZIDE 5 MG/1
5 TABLET ORAL
Refills: 0 | Status: ACTIVE | COMMUNITY
Start: 2018-08-15

## 2018-08-15 RX ORDER — CHOLECALCIFEROL (VITAMIN D3) 50 MCG
50 MCG TABLET ORAL DAILY
Refills: 0 | Status: ACTIVE | COMMUNITY

## 2018-08-15 RX ORDER — ROSUVASTATIN CALCIUM 10 MG/1
10 TABLET, FILM COATED ORAL
Refills: 0 | Status: ACTIVE | COMMUNITY
Start: 2018-08-15

## 2018-08-15 RX ORDER — B-COMPLEX WITH VITAMIN C
TABLET ORAL
Refills: 0 | Status: ACTIVE | COMMUNITY

## 2018-08-15 RX ORDER — SIMVASTATIN 40 MG/1
40 TABLET, FILM COATED ORAL
Refills: 0 | Status: DISCONTINUED | COMMUNITY
Start: 2017-07-27 | End: 2018-08-15

## 2018-08-15 RX ORDER — CLOPIDOGREL 75 MG/1
75 TABLET, FILM COATED ORAL
Refills: 0 | Status: DISCONTINUED | COMMUNITY
Start: 2017-07-27 | End: 2018-08-15

## 2018-08-16 LAB
ALBUMIN SERPL ELPH-MCNC: 4.6 G/DL
ALP BLD-CCNC: 47 U/L
ALT SERPL-CCNC: 22 U/L
ANION GAP SERPL CALC-SCNC: 14 MMOL/L
APPEARANCE: CLEAR
AST SERPL-CCNC: 16 U/L
BACTERIA: NEGATIVE
BASOPHILS # BLD AUTO: 0.08 K/UL
BASOPHILS NFR BLD AUTO: 1 %
BILIRUB SERPL-MCNC: 0.3 MG/DL
BILIRUBIN URINE: NEGATIVE
BLOOD URINE: NEGATIVE
BUN SERPL-MCNC: 12 MG/DL
CALCIUM SERPL-MCNC: 9.8 MG/DL
CHLORIDE SERPL-SCNC: 99 MMOL/L
CHOLEST SERPL-MCNC: 156 MG/DL
CHOLEST/HDLC SERPL: 4.2 RATIO
CO2 SERPL-SCNC: 27 MMOL/L
COLOR: YELLOW
CREAT SERPL-MCNC: 1.01 MG/DL
EOSINOPHIL # BLD AUTO: 0.56 K/UL
EOSINOPHIL NFR BLD AUTO: 6.8 %
GLUCOSE QUALITATIVE U: 100 MG/DL
GLUCOSE SERPL-MCNC: 166 MG/DL
HCT VFR BLD CALC: 47.4 %
HDLC SERPL-MCNC: 37 MG/DL
HGB BLD-MCNC: 15.7 G/DL
IMM GRANULOCYTES NFR BLD AUTO: 0.2 %
KETONES URINE: NEGATIVE
LDLC SERPL CALC-MCNC: 72 MG/DL
LEUKOCYTE ESTERASE URINE: NEGATIVE
LYMPHOCYTES # BLD AUTO: 2.62 K/UL
LYMPHOCYTES NFR BLD AUTO: 32 %
MAN DIFF?: NORMAL
MCHC RBC-ENTMCNC: 29.7 PG
MCHC RBC-ENTMCNC: 33.1 GM/DL
MCV RBC AUTO: 89.8 FL
MICROSCOPIC-UA: NORMAL
MONOCYTES # BLD AUTO: 0.4 K/UL
MONOCYTES NFR BLD AUTO: 4.9 %
NEUTROPHILS # BLD AUTO: 4.52 K/UL
NEUTROPHILS NFR BLD AUTO: 55.1 %
NITRITE URINE: NEGATIVE
PH URINE: 5
PLATELET # BLD AUTO: 219 K/UL
POTASSIUM SERPL-SCNC: 4.6 MMOL/L
PROT SERPL-MCNC: 7.5 G/DL
PROTEIN URINE: NEGATIVE MG/DL
RBC # BLD: 5.28 M/UL
RBC # FLD: 13.8 %
RED BLOOD CELLS URINE: 0 /HPF
SODIUM SERPL-SCNC: 140 MMOL/L
SPECIFIC GRAVITY URINE: 1.01
SQUAMOUS EPITHELIAL CELLS: 0 /HPF
TRIGL SERPL-MCNC: 233 MG/DL
UROBILINOGEN URINE: NEGATIVE MG/DL
WBC # FLD AUTO: 8.2 K/UL
WHITE BLOOD CELLS URINE: 1 /HPF

## 2019-10-16 ENCOUNTER — APPOINTMENT (OUTPATIENT)
Dept: OTHER | Facility: CLINIC | Age: 59
End: 2019-10-16
Payer: COMMERCIAL

## 2019-10-16 VITALS
BODY MASS INDEX: 24.16 KG/M2 | WEIGHT: 145 LBS | DIASTOLIC BLOOD PRESSURE: 80 MMHG | OXYGEN SATURATION: 98 % | SYSTOLIC BLOOD PRESSURE: 126 MMHG | HEIGHT: 65 IN | HEART RATE: 97 BPM | RESPIRATION RATE: 16 BRPM

## 2019-10-16 DIAGNOSIS — Z72.0 TOBACCO USE: ICD-10-CM

## 2019-10-16 DIAGNOSIS — F17.200 NICOTINE DEPENDENCE, UNSPECIFIED, UNCOMPLICATED: ICD-10-CM

## 2019-10-16 PROCEDURE — 94010 BREATHING CAPACITY TEST: CPT

## 2019-10-16 PROCEDURE — 99396 PREV VISIT EST AGE 40-64: CPT | Mod: 25

## 2019-10-16 RX ORDER — NICOTINE 21 MG/24HR
21 PATCH, TRANSDERMAL 24 HOURS TRANSDERMAL DAILY
Qty: 28 | Refills: 0 | Status: ACTIVE | COMMUNITY
Start: 2019-10-16 | End: 1900-01-01

## 2019-10-16 NOTE — DISCUSSION/SUMMARY
[Patient seen for WTC Monitoring ___] : Patient was seen for WTC monitoring [unfilled] [Please See Note in Chart and Documentation in Trial DB] : Please see note in chart and documentation in Trial DB. 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 \par Preventive Screening: never had colonoscopy, was scheduled, declining now \par was referred few years year but did not go\par  stent placed 2017 and taking ASA, \par on Brilinta \par \par Colonoscopy: never had one \par \par CXR:2018\par \par Spirometry: Compared with previous: restriction, no change \par \par A/P: CBC, CMP, lipids, UA ordered \par Smoking cessation recommended \par  pt was advised to speak to have screening colonoscopy , he declined at this time \par encouraged to call back to reconsider  \par  \par  pt is interested  in smoking cessation program, \par  i had discussed referral \par  and started him on Nicoderm parch 21 mg/day for 28 days \par  he is to call me in 3 weeks for a follow up \par  potential side effects of patches discussed ni details

## 2019-10-17 LAB
ALBUMIN SERPL ELPH-MCNC: 4.5 G/DL
ALP BLD-CCNC: 55 U/L
ALT SERPL-CCNC: 27 U/L
ANION GAP SERPL CALC-SCNC: 15 MMOL/L
APPEARANCE: CLEAR
AST SERPL-CCNC: 20 U/L
BACTERIA: NEGATIVE
BASOPHILS # BLD AUTO: 0.11 K/UL
BASOPHILS NFR BLD AUTO: 1.3 %
BILIRUB SERPL-MCNC: 0.5 MG/DL
BILIRUBIN URINE: NEGATIVE
BLOOD URINE: NEGATIVE
BUN SERPL-MCNC: 15 MG/DL
CALCIUM SERPL-MCNC: 9.9 MG/DL
CHLORIDE SERPL-SCNC: 100 MMOL/L
CHOLEST SERPL-MCNC: 141 MG/DL
CHOLEST/HDLC SERPL: 4.4 RATIO
CO2 SERPL-SCNC: 24 MMOL/L
COLOR: YELLOW
CREAT SERPL-MCNC: 1.3 MG/DL
EOSINOPHIL # BLD AUTO: 0.31 K/UL
EOSINOPHIL NFR BLD AUTO: 3.6 %
GLUCOSE QUALITATIVE U: ABNORMAL
GLUCOSE SERPL-MCNC: 329 MG/DL
HCT VFR BLD CALC: 48.3 %
HDLC SERPL-MCNC: 32 MG/DL
HGB BLD-MCNC: 15.5 G/DL
HYALINE CASTS: 0 /LPF
IMM GRANULOCYTES NFR BLD AUTO: 0.2 %
KETONES URINE: NEGATIVE
LDLC SERPL CALC-MCNC: 58 MG/DL
LEUKOCYTE ESTERASE URINE: NEGATIVE
LYMPHOCYTES # BLD AUTO: 2.67 K/UL
LYMPHOCYTES NFR BLD AUTO: 31.3 %
MAN DIFF?: NORMAL
MCHC RBC-ENTMCNC: 29.5 PG
MCHC RBC-ENTMCNC: 32.1 GM/DL
MCV RBC AUTO: 91.8 FL
MICROSCOPIC-UA: NORMAL
MONOCYTES # BLD AUTO: 0.51 K/UL
MONOCYTES NFR BLD AUTO: 6 %
NEUTROPHILS # BLD AUTO: 4.91 K/UL
NEUTROPHILS NFR BLD AUTO: 57.6 %
NITRITE URINE: NEGATIVE
PH URINE: 5.5
PLATELET # BLD AUTO: 255 K/UL
POTASSIUM SERPL-SCNC: 4.7 MMOL/L
PROT SERPL-MCNC: 6.9 G/DL
PROTEIN URINE: NORMAL
RBC # BLD: 5.26 M/UL
RBC # FLD: 13.3 %
RED BLOOD CELLS URINE: 1 /HPF
SODIUM SERPL-SCNC: 139 MMOL/L
SPECIFIC GRAVITY URINE: 1.03
SQUAMOUS EPITHELIAL CELLS: 0 /HPF
TRIGL SERPL-MCNC: 257 MG/DL
UROBILINOGEN URINE: NORMAL
WBC # FLD AUTO: 8.53 K/UL
WHITE BLOOD CELLS URINE: 1 /HPF

## 2020-01-29 ENCOUNTER — APPOINTMENT (OUTPATIENT)
Dept: ORTHOPEDIC SURGERY | Facility: CLINIC | Age: 60
End: 2020-01-29
Payer: COMMERCIAL

## 2020-01-29 VITALS — SYSTOLIC BLOOD PRESSURE: 120 MMHG | DIASTOLIC BLOOD PRESSURE: 76 MMHG | HEART RATE: 64 BPM

## 2020-01-29 DIAGNOSIS — D16.22 BENIGN NEOPLASM OF LONG BONES OF LEFT LOWER LIMB: ICD-10-CM

## 2020-01-29 PROCEDURE — 99203 OFFICE O/P NEW LOW 30 MIN: CPT

## 2020-01-29 NOTE — HISTORY OF PRESENT ILLNESS
[FreeTextEntry1] : This is the first visit of a 59 years old male with history of bilateral knee pain recently x-ray of the left knee demonstrated an incidental unrelated of a benign appearing nonaggressive calcified lesion left distal femur suggested the enchondroma

## 2020-01-29 NOTE — REASON FOR VISIT
[FreeTextEntry1] : Presented for evaluation of incidental calcified lesion left distal femur in a patient with bilateral knee pain

## 2020-01-29 NOTE — PHYSICAL EXAM
[FreeTextEntry1] : Physical exam reveals a healthy-looking the patient in no apparent distress patient appeared to be fully alert oriented having no significant complaints besides bilateral knee pain on exam patient having full range of motion of both knees no swelling no palpable mass no gross neurovascular deficits review of x-ray of the left knee demonstrated a tiny calcified lesion distal femur suggested be enchondroma at this stage patient was recommended to be followed conservatively and didn't see an as needed

## 2021-03-22 ENCOUNTER — APPOINTMENT (OUTPATIENT)
Dept: OTHER | Facility: CLINIC | Age: 61
End: 2021-03-22
Payer: COMMERCIAL

## 2021-03-22 VITALS
WEIGHT: 145 LBS | RESPIRATION RATE: 16 BRPM | HEIGHT: 65 IN | OXYGEN SATURATION: 98 % | DIASTOLIC BLOOD PRESSURE: 91 MMHG | HEART RATE: 84 BPM | BODY MASS INDEX: 24.16 KG/M2 | SYSTOLIC BLOOD PRESSURE: 144 MMHG | TEMPERATURE: 98 F

## 2021-03-22 PROCEDURE — 99396 PREV VISIT EST AGE 40-64: CPT | Mod: 25

## 2021-03-22 RX ORDER — ISOPROPYL ALCOHOL 0.75 G/1
SWAB TOPICAL
Qty: 100 | Refills: 0 | Status: COMPLETED | COMMUNITY
Start: 2021-03-16

## 2021-03-22 RX ORDER — IBUPROFEN 600 MG/1
600 TABLET, FILM COATED ORAL
Qty: 28 | Refills: 0 | Status: COMPLETED | COMMUNITY
Start: 2020-12-01

## 2021-03-22 RX ORDER — LANCETS 28 GAUGE
EACH MISCELLANEOUS
Qty: 200 | Refills: 0 | Status: COMPLETED | COMMUNITY
Start: 2021-03-16

## 2021-03-22 RX ORDER — AMOXICILLIN AND CLAVULANATE POTASSIUM 875; 125 MG/1; MG/1
875-125 TABLET, COATED ORAL
Qty: 14 | Refills: 0 | Status: COMPLETED | COMMUNITY
Start: 2020-12-01

## 2021-03-22 RX ORDER — BLOOD SUGAR DIAGNOSTIC
STRIP MISCELLANEOUS
Qty: 200 | Refills: 0 | Status: COMPLETED | COMMUNITY
Start: 2021-03-16

## 2021-03-22 RX ORDER — DESONIDE 0.5 MG/G
0.05 OINTMENT TOPICAL
Qty: 60 | Refills: 0 | Status: COMPLETED | COMMUNITY
Start: 2021-03-16

## 2021-03-22 RX ORDER — NAPROXEN 375 MG/1
375 TABLET ORAL
Qty: 14 | Refills: 0 | Status: COMPLETED | COMMUNITY
Start: 2020-10-02

## 2021-03-22 RX ORDER — PREDNISONE 50 MG/1
50 TABLET ORAL
Qty: 5 | Refills: 0 | Status: COMPLETED | COMMUNITY
Start: 2021-02-03

## 2021-03-22 RX ORDER — GABAPENTIN 300 MG/1
300 CAPSULE ORAL
Qty: 14 | Refills: 0 | Status: COMPLETED | COMMUNITY
Start: 2021-03-16

## 2021-03-22 RX ORDER — ROSUVASTATIN CALCIUM 20 MG/1
20 TABLET, FILM COATED ORAL
Qty: 90 | Refills: 0 | Status: COMPLETED | COMMUNITY
Start: 2021-03-16

## 2021-03-22 RX ORDER — GLIPIZIDE 5 MG/1
5 TABLET, FILM COATED, EXTENDED RELEASE ORAL
Qty: 90 | Refills: 0 | Status: COMPLETED | COMMUNITY
Start: 2021-03-16

## 2021-03-22 RX ORDER — AMOXICILLIN 500 MG/1
500 CAPSULE ORAL
Qty: 21 | Refills: 0 | Status: COMPLETED | COMMUNITY
Start: 2021-02-10

## 2021-03-22 RX ORDER — NAPROXEN 500 MG/1
500 TABLET ORAL
Qty: 14 | Refills: 0 | Status: COMPLETED | COMMUNITY
Start: 2021-02-10

## 2021-03-22 NOTE — DISCUSSION/SUMMARY
[Patient seen for WTC Monitoring ___] : Patient was seen for WTC monitoring [unfilled] [Please See Note in Chart and Documentation in Trial DB] : Please see note in chart and documentation in Trial DB. [FreeTextEntry3] : \par WTC GZ Exposure Hx: arrived [place] on 09 11 2001 transporting people to [facility]. 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 \par Preventive Screening: never had colonoscopy, was scheduled, agreed ti referral\par was referred few years year but did not go\par  stent placed 2017 and taking ASA, \par on Brilinta \par \par Colonoscopy: never had one \par \par CXR: referred \par \par Spirometry: Cnot done this year due to COVID precautions \par \par A/P: WTC annual MV\par  CBC, CMP, lipids, UA ordered \par Smoking cessation recommended \par referred to [facility] for colon cancer screening,  screening colonoscopy preferable \par  \par  pt is interested  in smoking cessation program, \par  i had made  referral to smoking cessation program

## 2021-03-29 LAB
ALBUMIN SERPL ELPH-MCNC: 4.7 G/DL
ALP BLD-CCNC: 46 U/L
ALT SERPL-CCNC: 38 U/L
ANION GAP SERPL CALC-SCNC: 17 MMOL/L
APPEARANCE: CLEAR
AST SERPL-CCNC: 18 U/L
BACTERIA: NEGATIVE
BASOPHILS # BLD AUTO: 0.12 K/UL
BASOPHILS NFR BLD AUTO: 1.5 %
BILIRUB SERPL-MCNC: 0.2 MG/DL
BILIRUBIN URINE: NEGATIVE
BLOOD URINE: NEGATIVE
BUN SERPL-MCNC: 11 MG/DL
CALCIUM SERPL-MCNC: 9.7 MG/DL
CHLORIDE SERPL-SCNC: 106 MMOL/L
CHOLEST SERPL-MCNC: 118 MG/DL
CO2 SERPL-SCNC: 21 MMOL/L
COLOR: YELLOW
CREAT SERPL-MCNC: 1.1 MG/DL
EOSINOPHIL # BLD AUTO: 0.45 K/UL
EOSINOPHIL NFR BLD AUTO: 5.8 %
GLUCOSE QUALITATIVE U: NEGATIVE
GLUCOSE SERPL-MCNC: 126 MG/DL
HCT VFR BLD CALC: 47.1 %
HDLC SERPL-MCNC: 39 MG/DL
HGB BLD-MCNC: 14.8 G/DL
HYALINE CASTS: 2 /LPF
IMM GRANULOCYTES NFR BLD AUTO: 0.3 %
KETONES URINE: NEGATIVE
LDLC SERPL CALC-MCNC: 54 MG/DL
LEUKOCYTE ESTERASE URINE: NEGATIVE
LYMPHOCYTES # BLD AUTO: 2.57 K/UL
LYMPHOCYTES NFR BLD AUTO: 32.9 %
MAN DIFF?: NORMAL
MCHC RBC-ENTMCNC: 29.1 PG
MCHC RBC-ENTMCNC: 31.4 GM/DL
MCV RBC AUTO: 92.5 FL
MICROSCOPIC-UA: NORMAL
MONOCYTES # BLD AUTO: 0.69 K/UL
MONOCYTES NFR BLD AUTO: 8.8 %
NEUTROPHILS # BLD AUTO: 3.97 K/UL
NEUTROPHILS NFR BLD AUTO: 50.7 %
NITRITE URINE: NEGATIVE
NONHDLC SERPL-MCNC: 80 MG/DL
PH URINE: 5.5
PLATELET # BLD AUTO: 226 K/UL
POTASSIUM SERPL-SCNC: 5 MMOL/L
PROT SERPL-MCNC: 7.3 G/DL
PROTEIN URINE: ABNORMAL
RBC # BLD: 5.09 M/UL
RBC # FLD: 13.9 %
RED BLOOD CELLS URINE: 2 /HPF
SODIUM SERPL-SCNC: 144 MMOL/L
SPECIFIC GRAVITY URINE: 1.03
SQUAMOUS EPITHELIAL CELLS: 0 /HPF
TRIGL SERPL-MCNC: 126 MG/DL
UROBILINOGEN URINE: NORMAL
WBC # FLD AUTO: 7.82 K/UL
WHITE BLOOD CELLS URINE: 1 /HPF

## 2021-04-30 ENCOUNTER — APPOINTMENT (OUTPATIENT)
Dept: GASTROENTEROLOGY | Facility: CLINIC | Age: 61
End: 2021-04-30
Payer: COMMERCIAL

## 2021-04-30 VITALS
TEMPERATURE: 97.1 F | BODY MASS INDEX: 23.49 KG/M2 | OXYGEN SATURATION: 98 % | HEART RATE: 90 BPM | DIASTOLIC BLOOD PRESSURE: 87 MMHG | SYSTOLIC BLOOD PRESSURE: 131 MMHG | WEIGHT: 141 LBS | HEIGHT: 65 IN

## 2021-04-30 DIAGNOSIS — K21.9 GASTRO-ESOPHAGEAL REFLUX DISEASE W/OUT ESOPHAGITIS: ICD-10-CM

## 2021-04-30 DIAGNOSIS — Z01.818 ENCOUNTER FOR OTHER PREPROCEDURAL EXAMINATION: ICD-10-CM

## 2021-04-30 DIAGNOSIS — Z12.11 ENCOUNTER FOR SCREENING FOR MALIGNANT NEOPLASM OF COLON: ICD-10-CM

## 2021-04-30 PROCEDURE — 99204 OFFICE O/P NEW MOD 45 MIN: CPT

## 2021-04-30 NOTE — HISTORY OF PRESENT ILLNESS
[None] : had no significant interval events [Vomiting] : denies vomiting [Nausea] : denies nausea [Diarrhea] : denies diarrhea [Constipation] : denies constipation [Yellow Skin Or Eyes (Jaundice)] : denies jaundice [Abdominal Pain] : denies abdominal pain [Rectal Pain] : denies rectal pain [Heartburn] : heartburn [Abdominal Swelling] : abdominal swelling [GERD] : gastroesophageal reflux disease [Wt Gain ___ Lbs] : no recent weight gain [Wt Loss ___ Lbs] : no recent weight loss [Hiatus Hernia] : no hiatus hernia [Peptic Ulcer Disease] : no peptic ulcer disease [Pancreatitis] : no pancreatitis [Cholelithiasis] : no cholelithiasis [Kidney Stone] : no kidney stone [Inflammatory Bowel Disease] : no inflammatory bowel disease [Irritable Bowel Syndrome] : no irritable bowel syndrome [Diverticulitis] : no diverticulitis [Alcohol Abuse] : no alcohol abuse [Malignancy] : no malignancy [Abdominal Surgery] : no abdominal surgery [Appendectomy] : no appendectomy [Cholecystectomy] : no cholecystectomy [de-identified] : The patient is a 60-year-old [facility] male with past medical history significant for hypertension, hypercholesterolemia, diabetes mellitus and coronary artery disease, s/p cardiac stent placement in 2016 on Brilenta who was referred to my office by Dr. Shabnam Persaud and [facility] for dyspepsia and gastroesophageal reflux disease. The patient also admits to coming to the office for colon cancer screening. I was asked to render an opinion for consultation for the above complaints.   The patient states that he is feeling fine.  The patient denies any abdominal pain.  The patient complains of abdominal gas and bloating.  The patient denies any nausea or vomiting.  The patient complains of occasional gastroesophageal reflux disease but denies any dysphagia. The gastroesophageal reflux disease is worse after meals and late at night and in the early morning. The gastroesophageal reflux disease previously improved with proton pump inhibitors, H2 blockers and antacids.  The patient complains of dyspnea upon exertion but denies any atypical chest pain, shortness of breath or palpitations.  The patient is being followed by his cardiologist, Dr. Diane Jhaveri ([phone]).  The last visit was 1 month ago that included a stress test.  According to the patient, his cardiac status is stable.   The patient denies any diaphoresis. The patient denies any constipation or diarrhea.  The patient has 1 to 2 bowel movements a day.  The patient denies a change in bowel habits.  The patient denies a change in caliber of stool.  The patient denies having mucus discharge with the bowel movements.  The patient denies any bright red blood per rectum, melena or hematemesis.  The patient denies any rectal pain or rectal pruritus. The patient denies any weight loss or anorexia.  He denies any fevers or chills.  The patient denies any jaundice or pruritus.  The patient complains of occasional lower back pain.  The patient denies ever having a prior upper endoscopy and colonoscopy performed by another gastroenterologist.  The patient denies any significant family history of GI problems.  [de-identified] : (+) smoking 1/2 PPD x 40 years, (-) ETOH, (-) IVDA\par

## 2021-04-30 NOTE — REVIEW OF SYSTEMS
[Eyesight Problems] : eyesight problems [Nasal Discharge] : nasal discharge [Heartburn] : heartburn [Arthralgias] : arthralgias [Joint Pain] : joint pain [Itching] : itching [Anxiety] : anxiety [Negative] : Heme/Lymph [de-identified] : jamal

## 2021-04-30 NOTE — ASSESSMENT
[FreeTextEntry1] : Dyspepsia: The patient complains of dyspeptic symptoms.  The patient was advised to abide by an anti-gas diet.  The patient was given a pamphlet for anti-gas.  The patient and I reviewed the anti-gas diet at length. The patient is to start on a trial of Phazyme one tablet 3 times a day p.r.n. abdominal pain and gas.\par GERD: The patient was advised to avoid late-night meals and dietary indiscretions.  The patient was advised to avoid fried and fatty foods.  The patient was advised to abide by an anti-GERD diet. The patient was given a pamphlet for anti-GERD.  The patient and I reviewed the anti-GERD diet at length. I recommend a trial of Pepcid ac 10 mg twice a day PRN for the symptoms.\par If the symptoms persist, the patient may require an upper endoscopy to assess for peptic ulcer disease versus esophagitis.  The patient was told of the risks and benefits of the procedure.  The patient was told of the risks of perforation, emergency surgery, bleeding, infections and missed lesions.  The patient agreed and will follow-up to reassess the symptoms.\par Colon Cancer screening: I recommend colonoscopy for colon cancer screening over the age of 45 to assess for colonic polyps. The patient was told of the risks and benefits of the procedure.  The patient was told of the risks of perforation, emergency surgery, bleeding, infections and missed lesions. The patient agreed and will schedule for the procedure. The patient is to be n.p.o. after midnight and bowel prep was given.  The patient is to return for the procedure. \par Colonoscopy: I recommend a colonoscopy to assess the symptoms.  The patient was told of the risks and benefits of the procedure.  The patient was told of the risks of perforation, emergency surgery, bleeding, infections and missed lesions.  The patient agreed and will schedule for the procedure. The patient can take the antihypertensive medication with a sip of water one hour prior to the procedure. The patient is to hold the diabetic medication the day before and the morning of the procedure. The patient is to hold the blood thinner medication (Brilenta) for 5 days prior to the procedure. The patient is to be n.p.o. after midnight and bowel prep was given.  The patient is to return for the procedure. \par Follow-up: The patient is to follow-up in the office in 4 weeks to reassess the symptoms. The patient was told to call the office if any further problems. \par \par \par

## 2021-05-03 LAB — HEMOCCULT STL QL: NEGATIVE

## 2021-08-08 DIAGNOSIS — Z01.818 ENCOUNTER FOR OTHER PREPROCEDURAL EXAMINATION: ICD-10-CM

## 2021-08-09 ENCOUNTER — APPOINTMENT (OUTPATIENT)
Dept: DISASTER EMERGENCY | Facility: CLINIC | Age: 61
End: 2021-08-09

## 2021-08-10 LAB — SARS-COV-2 N GENE NPH QL NAA+PROBE: NOT DETECTED

## 2021-08-12 ENCOUNTER — OUTPATIENT (OUTPATIENT)
Dept: OUTPATIENT SERVICES | Facility: HOSPITAL | Age: 61
LOS: 1 days | End: 2021-08-12
Payer: COMMERCIAL

## 2021-08-12 ENCOUNTER — RESULT REVIEW (OUTPATIENT)
Age: 61
End: 2021-08-12

## 2021-08-12 ENCOUNTER — APPOINTMENT (OUTPATIENT)
Dept: GASTROENTEROLOGY | Facility: HOSPITAL | Age: 61
End: 2021-08-12

## 2021-08-12 DIAGNOSIS — Z12.11 ENCOUNTER FOR SCREENING FOR MALIGNANT NEOPLASM OF COLON: ICD-10-CM

## 2021-08-12 DIAGNOSIS — Z98.890 OTHER SPECIFIED POSTPROCEDURAL STATES: Chronic | ICD-10-CM

## 2021-08-12 LAB — GLUCOSE BLDC GLUCOMTR-MCNC: 129 MG/DL — HIGH (ref 70–99)

## 2021-08-12 PROCEDURE — 45380 COLONOSCOPY AND BIOPSY: CPT

## 2021-08-12 PROCEDURE — 88305 TISSUE EXAM BY PATHOLOGIST: CPT

## 2021-08-12 PROCEDURE — 88305 TISSUE EXAM BY PATHOLOGIST: CPT | Mod: 26

## 2021-08-12 PROCEDURE — 45380 COLONOSCOPY AND BIOPSY: CPT | Mod: PT

## 2021-08-12 PROCEDURE — 82962 GLUCOSE BLOOD TEST: CPT

## 2021-08-12 NOTE — CHART NOTE - NSCHARTNOTEFT_GEN_A_CORE
Colonoscopy Report:    Indication:          Colon cancer screening  Referring MD:    Dr. Paige Persaud  Instrument:        # [id number]  Anesthesia:         MAC    Consent:  Informed consent was obtained from the patient after providing any opportunity for questions  Procedure: After placing the patient in the left lateral decubitus position, the colonoscope was gently inserted into the rectum and advanced to the terminal ileum and cecum. Color, texture, mucosa, and anatomy of the colon were carefully examined with the scope. The patient tolerated the procedure well. After completion of the exam, the patient was transferred to the recovery room.     Procedure: Colonoscopy    Preparation: Nulytely (Adequate Prep)    Findings:     Anal Canal:	      Normal appearing anal canal. (+) Small internal hemorrhoids noted on retroflex view.  Rectum:	                    Normal appearing rectal mucosa with no polyps, masses, diverticulosis, AVMs or proctitis noted.  Sigmoid Colon:  	      Normal appearing colonic mucosa with no polyps, masses, diverticulosis, AVMs or colitis noted.  Descending Colon:       Normal appearing colonic mucosa with no polyps, masses, diverticulosis, AVMs or colitis noted.  Splenic Flexure:	      Normal appearing colonic mucosa with no polyps, masses, diverticulosis, AVMs or colitis noted.  Transverse Colon:        Normal appearing colonic mucosa with no polyps, masses, diverticulosis, AVMs or colitis noted.  Hepatic Flexure:	      Normal appearing colonic mucosa with no polyps, masses, diverticulosis, AVMs or colitis noted.  Ascending Colon: 	      Normal appearing colonic mucosa with no polyps, masses, diverticulosis, AVMs or colitis noted.  Cecum:	                    Normal appearing colonic mucosa with no polyps, masses, diverticulosis, AVMs or colitis noted.  Ileo-cecal Valve:	      Normal appearing ileo-cecal valve mucosa with no polyps, masses, AVMs or ileo-colitis noted.  Ileum:                          Normal ileal mucosa with no polyps, masses, diverticulosis, AVMs or ileitis noted.  	    EBL:0    Impression:  1.     A/P:     1. Recommend a high fiber diet  2. Consider a trial of Anusol HC suppositories one per rectum q.h.s. and Anusol HC 2.5% cream applied to affected area twice a day BID hemorrhoidal bleeding or pain.  3. Recommend a repeat colonoscopy in 10 years to reassess for colonic polyps unless symptomatic.  4. Followup in the office in 4 weeks to reassess symptoms and discussed the findings.      Procedure Start Time:    Cecum Reached Time:   Procedure End Time:     Total Withdrawal Time:       Attending:       Duglas Blum M.D. Colonoscopy Report:    Indication:          Colon cancer screening  Referring MD:    Dr. Paige Persaud  Instrument:        # [id number]  Anesthesia:         MAC    Consent:  Informed consent was obtained from the patient after providing any opportunity for questions  Procedure: After placing the patient in the left lateral decubitus position, the colonoscope was gently inserted into the rectum and advanced to the cecum. Color, texture, mucosa, and anatomy of the colon were carefully examined with the scope. The patient tolerated the procedure well. After completion of the exam, the patient was transferred to the recovery room.     Procedure: Colonoscopy and biopsy    Preparation: Nulytely (Adequate Prep)    Findings:     Anal Canal:	      Normal appearing anal canal. (+) Small internal hemorrhoids noted on retroflex view.  Rectum:	                    Normal appearing rectal mucosa with no polyps, masses, diverticulosis, AVMs or proctitis noted.  Sigmoid Colon:  	      Scattered diverticulosis but no polyps, masses, AVMs or colitis noted.  Descending Colon:       Scattered diverticulosis but no polyps, masses, AVMs or colitis noted.  Splenic Flexure:	      Normal appearing colonic mucosa with no polyps, masses, diverticulosis, AVMs or colitis noted.  Transverse Colon:        (0.3 x 0.3 cm) Small transverse colon polyp at 65 cm was removed completely with [employer] biopsy forcep.  There were no other polyps, masses,                                   diverticulosis, AVMs or colitis noted.   Hepatic Flexure:	      Normal appearing colonic mucosa with no polyps, masses, diverticulosis, AVMs or colitis noted.  Ascending Colon: 	      Normal appearing colonic mucosa with no polyps, masses, diverticulosis, AVMs or colitis noted.  Cecum:	                    Normal appearing colonic mucosa with no polyps, masses, diverticulosis, AVMs or colitis noted.  Ileo-cecal Valve:	      Normal appearing ileo-cecal valve mucosa with no polyps, masses, AVMs or ileo-colitis noted.  Ileum:                         Not visualized.  	    EBL:0    Impression:  1. Transverse colon polyp  2. Scattered left sided diverticulosis 3. Small internal hemorrhoids    A/P:    1. Recommend a high fiber diet and avoid seeds.  2. Consider a trial of Anusol HC suppositories 1 per rectum QHS and Anusol HC 2.5% cream apply to affected area BID PRN hemorrhoidal bleeding or pain.  3. Will check path results.  4. Recommend a colonoscopy in 5 years to reassess for colonic polyps unless symptomatic.  5. Follow-up in the office in 4 weeks to reassess the symptoms and discuss the findings.    Procedure Start Time:    7:58 am  Cecum Reached Time:    8:02 am  Procedure End Time:      8:12 am  Total Withdrawal Time:   10 Minutes      Attending:       Duglas Blum M.D.

## 2021-08-16 LAB — SURGICAL PATHOLOGY STUDY: SIGNIFICANT CHANGE UP

## 2021-09-10 ENCOUNTER — APPOINTMENT (OUTPATIENT)
Dept: GASTROENTEROLOGY | Facility: CLINIC | Age: 61
End: 2021-09-10
Payer: COMMERCIAL

## 2021-09-10 VITALS
HEIGHT: 65 IN | HEART RATE: 76 BPM | TEMPERATURE: 97.2 F | OXYGEN SATURATION: 99 % | BODY MASS INDEX: 24.32 KG/M2 | DIASTOLIC BLOOD PRESSURE: 82 MMHG | WEIGHT: 146 LBS | SYSTOLIC BLOOD PRESSURE: 143 MMHG

## 2021-09-10 DIAGNOSIS — K63.5 POLYP OF COLON: ICD-10-CM

## 2021-09-10 DIAGNOSIS — K64.8 OTHER HEMORRHOIDS: ICD-10-CM

## 2021-09-10 DIAGNOSIS — R10.13 EPIGASTRIC PAIN: ICD-10-CM

## 2021-09-10 DIAGNOSIS — K57.90 DIVERTICULOSIS OF INTESTINE, PART UNSPECIFIED, W/OUT PERFORATION OR ABSCESS W/OUT BLEEDING: ICD-10-CM

## 2021-09-10 PROCEDURE — 99214 OFFICE O/P EST MOD 30 MIN: CPT

## 2021-09-10 NOTE — ASSESSMENT
[FreeTextEntry1] : Dyspepsia: The patient complains of dyspeptic symptoms.  The patient was advised to abide by an anti-gas diet.  The patient was given a pamphlet for anti-gas.  The patient and I reviewed the anti-gas diet at length. The patient is to start on a trial of Phazyme one tablet 3 times a day p.r.n. abdominal pain and gas.\par Diverticulosis: I recommend a high-fiber diet and avoid seeds. The patient is to consider a trial of Metamucil once a day for fiber supplementation. The patient is to also consider a trial of a probiotic such as Align once a day. \par Internal Hemorrhoids: The patient is to consider a trial of Anusol H. C. suppositories one per rectum nightly and Anusol HC2 .5% cream apply to affected area twice a day p.r.n. hemorrhoidal bleeding or pain. \par Colonic Polyp: The patient was found to have colonic polyps on recent colonoscopy.  I recommend a repeat colonoscopy in 5 years to reassess for colonic polyps pending patient’s health unless symptomatic.  The patient agreed and will follow up for the procedure. \par Follow-up: The patient is to follow-up in the office in 1 year to reassess the symptoms. The patient was told to call the office if any further problems. \par

## 2021-09-10 NOTE — HISTORY OF PRESENT ILLNESS
[None] : had no significant interval events [Heartburn] : denies heartburn [Nausea] : denies nausea [Vomiting] : denies vomiting [Diarrhea] : denies diarrhea [Constipation] : denies constipation [Yellow Skin Or Eyes (Jaundice)] : denies jaundice [Abdominal Pain] : denies abdominal pain [Rectal Pain] : denies rectal pain [Abdominal Swelling] : abdominal swelling [Wt Gain ___ Lbs] : no recent weight gain [Wt Loss ___ Lbs] : no recent weight loss [GERD] : no gastroesophageal reflux disease [Hiatus Hernia] : no hiatus hernia [Peptic Ulcer Disease] : no peptic ulcer disease [Pancreatitis] : no pancreatitis [Cholelithiasis] : no cholelithiasis [Kidney Stone] : no kidney stone [Inflammatory Bowel Disease] : no inflammatory bowel disease [Irritable Bowel Syndrome] : no irritable bowel syndrome [Diverticulitis] : no diverticulitis [Alcohol Abuse] : no alcohol abuse [Malignancy] : no malignancy [Abdominal Surgery] : no abdominal surgery [Appendectomy] : no appendectomy [Cholecystectomy] : no cholecystectomy [de-identified] : The patient has a history significant for hypertension, hypercholesterolemia, diabetes mellitus and coronary artery disease, s/p cardiac stent placement in 2016 on Brilenta.  The patient states that he is feeling fine. The patient denies any jaundice or pruritus.  The patient denies any chronic lower back pain. The patient denies any abdominal pain.  The patient complains of abdominal gas and bloating.  The patient denies any nausea or vomiting.  The patient denies any gastroesophageal reflux disease or dysphagia.   The patient denies any atypical chest pain, shortness of breath or palpitations.  The patient denies any diaphoresis. The patient denies any constipation or diarrhea.  The patient has 1 bowel movement a day.  The patient denies a change in bowel habits.  The patient denies a change in caliber of stool.  The patient denies having mucus discharge with the bowel movements.  The patient denies any bright red blood per rectum, melena or hematemesis.  The patient denies any rectal pain or rectal pruritus.  The patient complains of anorexia but denies any weight loss.  He denies any fevers or chills.  The patient had a colonoscopy to the cecum performed at the [facility] GI endoscopy suite on August 12, 2021. The colonoscopy to the cecum revealed a transverse colon polyp, scattered left sided diverticulosis and small internal hemorrhoids.  The colonic polyp was completely removed with a jumbo biopsy forcep.  There were no other polyps, masses, AVMs or colitis noted.  The pathology revealed colonic mucosa with adenomatous change and lymphoid follicles. The patient tolerated the procedure well.  The patient is being followed by his cardiologist, Dr. Diane Jhaveri ([phone]). The last visit was 1 month ago that included a stress test. According to the patient, his cardiac status is stable.  The patient denies any significant family history of GI problems.  [de-identified] : (+) smoking 1/2 PPD x 40 years, (-) ETOH, (-) IVDA\par \par

## 2021-09-21 NOTE — DISCHARGE NOTE ADULT - NS AS DC FOLLOWUP INST YN
Vickey Bolden  CARDIOVASCULAR DISEASE  [address]  [place] [address]  Phone: [phone]  Fax: [phone]  Follow Up Time: 1 week    Gaby Grant  HEMATOLOGY  [address] [place] [address]  Phone: [phone]  Fax: [phone]  Follow Up Time: Routine   Vickey Bolden  CARDIOVASCULAR DISEASE  [address]  [place] [address]  Phone: [phone]  Fax: [phone]  Follow Up Time: 1 week    Gaby Grant  HEMATOLOGY  [address] [place] [address]  Phone: [phone]  Fax: [phone]  Follow Up Time: Routine    Ana Luisa Sr  INTERNAL MEDICINE  [address] [place] [address]  Phone: [phone]  Fax: [phone]  Follow Up Time: 2 weeks   Yes

## 2022-06-21 ENCOUNTER — APPOINTMENT (OUTPATIENT)
Dept: OTHER | Facility: CLINIC | Age: 62
End: 2022-06-21
Payer: COMMERCIAL

## 2022-06-21 VITALS
SYSTOLIC BLOOD PRESSURE: 156 MMHG | OXYGEN SATURATION: 99 % | TEMPERATURE: 97.6 F | BODY MASS INDEX: 23.82 KG/M2 | HEART RATE: 93 BPM | HEIGHT: 65 IN | DIASTOLIC BLOOD PRESSURE: 96 MMHG | WEIGHT: 143 LBS

## 2022-06-21 PROCEDURE — 99396 PREV VISIT EST AGE 40-64: CPT | Mod: 25

## 2022-06-21 RX ORDER — SODIUM SULFATE, POTASSIUM SULFATE, MAGNESIUM SULFATE 17.5; 3.13; 1.6 G/ML; G/ML; G/ML
17.5-3.13-1.6 SOLUTION, CONCENTRATE ORAL
Qty: 1 | Refills: 0 | Status: DISCONTINUED | COMMUNITY
Start: 2021-04-30 | End: 2022-06-21

## 2022-06-21 RX ORDER — CYCLOBENZAPRINE HYDROCHLORIDE 5 MG/1
5 TABLET, FILM COATED ORAL
Qty: 14 | Refills: 0 | Status: DISCONTINUED | COMMUNITY
Start: 2022-04-22

## 2022-06-21 RX ORDER — AZITHROMYCIN 500 MG/1
500 TABLET, FILM COATED ORAL
Qty: 3 | Refills: 0 | Status: DISCONTINUED | COMMUNITY
Start: 2022-01-18

## 2022-06-21 NOTE — DISCUSSION/SUMMARY
[Patient seen for WTC Monitoring ___] : Patient was seen for WTC monitoring [unfilled] [Please See Note in Chart and Documentation in Trial DB] : Please see note in chart and documentation in Trial DB. 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[place] [facility] \par  Cardiologist: Dr Renteria \par had c cath 04 2017\par also has new PCP ; at [facility] [place]\par Occ Hx: works for [employer]\par PMH/PSH: HTN, Diabetes, CAD, PTCA in 2004\par Allergies: NKDA\par Meds: listed in allsript \par Soc Hx: \par Smoking Status: smoker 0.5 PPD since 1978 /44 years/ from 0.25 to 1 PPD \par Review of Systems IAMQ reviewed with patient\par \par Physical Exam:\par VS: reviewed in Allscripts\par Documented in Trial DB\par Preventive Screening: never had colonoscopy, was scheduled, agreed ti referral\par was referred few years year but did not go\par  stent placed 2017 and taking ASA, \par on Brilinta \par \par  \par \par CXR: 2021\par Spirometry: not done this year due to COVID precautions \par \par A/P: WTC annual MV\par  CBC, CMP, lipids, UA ordered \par Smoking cessation recommended \par \par  \par  pt is not  interested  in smoking cessation program, \par  i had offered   referral to smoking cessation program, pt declined \par \par  refer to LDCT chest for lung cancer screening

## 2022-06-21 NOTE — HEALTH RISK ASSESSMENT
[Patient reported colonoscopy was abnormal] : Patient reported colonoscopy was abnormal [ColonoscopyDate] : 2021 [ColonoscopyComments] : adenomatous mucosa

## 2022-06-22 ENCOUNTER — FORM ENCOUNTER (OUTPATIENT)
Age: 62
End: 2022-06-22

## 2022-06-22 LAB
ALBUMIN SERPL ELPH-MCNC: 4.8 G/DL
ALP BLD-CCNC: 61 U/L
ALT SERPL-CCNC: 28 U/L
ANION GAP SERPL CALC-SCNC: 14 MMOL/L
APPEARANCE: CLEAR
AST SERPL-CCNC: 18 U/L
BACTERIA: NEGATIVE
BASOPHILS # BLD AUTO: 0.15 K/UL
BASOPHILS NFR BLD AUTO: 1.8 %
BILIRUB SERPL-MCNC: 0.4 MG/DL
BILIRUBIN URINE: NEGATIVE
BLOOD URINE: NEGATIVE
BUN SERPL-MCNC: 18 MG/DL
CALCIUM SERPL-MCNC: 10 MG/DL
CHLORIDE SERPL-SCNC: 100 MMOL/L
CHOLEST SERPL-MCNC: 158 MG/DL
CO2 SERPL-SCNC: 25 MMOL/L
COLOR: YELLOW
CREAT SERPL-MCNC: 1.08 MG/DL
EGFR: 79 ML/MIN/1.73M2
EOSINOPHIL # BLD AUTO: 0.73 K/UL
EOSINOPHIL NFR BLD AUTO: 8.6 %
GLUCOSE QUALITATIVE U: NEGATIVE
GLUCOSE SERPL-MCNC: 198 MG/DL
HCT VFR BLD CALC: 48.2 %
HDLC SERPL-MCNC: 37 MG/DL
HGB BLD-MCNC: 15.9 G/DL
HYALINE CASTS: 1 /LPF
IMM GRANULOCYTES NFR BLD AUTO: 0.2 %
KETONES URINE: NEGATIVE
LDLC SERPL CALC-MCNC: 46 MG/DL
LEUKOCYTE ESTERASE URINE: NEGATIVE
LYMPHOCYTES # BLD AUTO: 2.53 K/UL
LYMPHOCYTES NFR BLD AUTO: 29.8 %
MAN DIFF?: NORMAL
MCHC RBC-ENTMCNC: 30.3 PG
MCHC RBC-ENTMCNC: 33 GM/DL
MCV RBC AUTO: 91.8 FL
MICROSCOPIC-UA: NORMAL
MONOCYTES # BLD AUTO: 0.69 K/UL
MONOCYTES NFR BLD AUTO: 8.1 %
NEUTROPHILS # BLD AUTO: 4.37 K/UL
NEUTROPHILS NFR BLD AUTO: 51.5 %
NITRITE URINE: NEGATIVE
NONHDLC SERPL-MCNC: 121 MG/DL
PH URINE: 5.5
PLATELET # BLD AUTO: 245 K/UL
POTASSIUM SERPL-SCNC: 4.5 MMOL/L
PROT SERPL-MCNC: 7.4 G/DL
PROTEIN URINE: NORMAL
RBC # BLD: 5.25 M/UL
RBC # FLD: 13.3 %
RED BLOOD CELLS URINE: 3 /HPF
SODIUM SERPL-SCNC: 139 MMOL/L
SPECIFIC GRAVITY URINE: 1.02
SQUAMOUS EPITHELIAL CELLS: 1 /HPF
TRIGL SERPL-MCNC: 373 MG/DL
UROBILINOGEN URINE: NORMAL
WBC # FLD AUTO: 8.49 K/UL
WHITE BLOOD CELLS URINE: 1 /HPF

## 2022-07-01 ENCOUNTER — NON-APPOINTMENT (OUTPATIENT)
Age: 62
End: 2022-07-01

## 2022-07-01 VITALS — BODY MASS INDEX: 24.59 KG/M2 | WEIGHT: 144 LBS | HEIGHT: 64 IN

## 2022-07-01 NOTE — PLAN
[Smoking Cessation Guidance Provided] : Smoking cessation guidance was provided to patient [Smoking Cessation] : smoking cessation [Age appropriate screenings] : Age appropriate screenings [Regular follow-up with healthcare provider] : regular follow-up with healthcare provider [FreeTextEntry1] : Plan:\par \par -Low Dose CT chest for lung cancer screening scheduled for 7/14/22 at [facility]\par \par -Patient to follow up with Dr. Segal to review results of LDCT\par \par \par -Encouraged smoking cessation\par \par \par \par Engaged in shared decision making with Nereyda TRISHA STERLING . Answered all questions. He verbalized understanding and agreement. He knows to call back with any questions or concerns\par

## 2022-07-01 NOTE — HISTORY OF PRESENT ILLNESS
[Current] : current smoker [_____ pack-years] : [unfilled] pack-years [TextBox_13] : Referred by Dr. Segal\par \par Mr. TRISHA KATZ is a 62 year old man with a history of nicotine dependence\par \par He was seen in the office by Dr. Segal for review of eligibility for, as well as, discussion of Low-Dose CT lung cancer screening program. Over the telephone today we reviewed and confirmed that the patient meets screening eligibility criteria:\par \par -Age: 62 years old\par \par Smoking status:\par \par -Current smoker\par \par -Number of pack(s) per day: .5\par \par -Number of years smoked:43\par \par -Number of pack years smokin\par \par \par \par Mr. KATZ denies any signs or symptoms of lung cancer including new cough, change in cough, hemoptysis and unintentional weight loss.\par \par Mr. KATZ denies any personal history of lung cancer. No lung cancer in a 1st degree relative. Denies any history of lung disease. Denies any history of occupational exposures\par  [TextBox_6] : .5 [TextBox_8] : 43

## 2022-07-01 NOTE — REASON FOR VISIT
[Home] : at home, [unfilled] , at the time of the visit. [Medical Office: ([facility])___] : at the medical office located in  [Verbal consent obtained from patient] : the patient, [unfilled] [Initial Evaluation] : an initial evaluation visit [Review of Eligibility] : review of eligibility [Low-Dose CT Screening Discussion] : low-dose CT lung cancer screening discussion [Virtual Visit] : virtual visit

## 2022-07-14 ENCOUNTER — APPOINTMENT (OUTPATIENT)
Dept: CT IMAGING | Facility: HOSPITAL | Age: 62
End: 2022-07-14

## 2022-07-14 ENCOUNTER — OUTPATIENT (OUTPATIENT)
Dept: OUTPATIENT SERVICES | Facility: HOSPITAL | Age: 62
LOS: 1 days | End: 2022-07-14
Payer: COMMERCIAL

## 2022-07-14 DIAGNOSIS — Z98.890 OTHER SPECIFIED POSTPROCEDURAL STATES: Chronic | ICD-10-CM

## 2022-07-14 DIAGNOSIS — Z04.9 ENCOUNTER FOR EXAMINATION AND OBSERVATION FOR UNSPECIFIED REASON: ICD-10-CM

## 2022-07-14 PROCEDURE — 71271 CT THORAX LUNG CANCER SCR C-: CPT

## 2022-07-14 PROCEDURE — 71271 CT THORAX LUNG CANCER SCR C-: CPT | Mod: 26

## 2023-06-09 ENCOUNTER — APPOINTMENT (OUTPATIENT)
Dept: OTHER | Facility: CLINIC | Age: 63
End: 2023-06-09
Payer: COMMERCIAL

## 2023-06-09 VITALS
SYSTOLIC BLOOD PRESSURE: 153 MMHG | TEMPERATURE: 98.1 F | DIASTOLIC BLOOD PRESSURE: 97 MMHG | WEIGHT: 140 LBS | HEIGHT: 64 IN | HEART RATE: 97 BPM | OXYGEN SATURATION: 98 % | BODY MASS INDEX: 23.9 KG/M2

## 2023-06-09 DIAGNOSIS — Z12.9 ENCOUNTER FOR SCREENING FOR MALIGNANT NEOPLASM, SITE UNSPECIFIED: ICD-10-CM

## 2023-06-09 DIAGNOSIS — Z04.9 ENCOUNTER FOR EXAMINATION AND OBSERVATION FOR UNSPECIFIED REASON: ICD-10-CM

## 2023-06-09 PROCEDURE — 99396 PREV VISIT EST AGE 40-64: CPT | Mod: 25

## 2023-06-09 PROCEDURE — 94010 BREATHING CAPACITY TEST: CPT

## 2023-06-09 NOTE — HEALTH RISK ASSESSMENT
[Patient reported colonoscopy was abnormal] : Patient reported colonoscopy was abnormal [ColonoscopyDate] : 2021 [ColonoscopyComments] : polyp with adenomatous mucosa

## 2023-06-09 NOTE — DISCUSSION/SUMMARY
[Patient seen for WTC Monitoring ___] : Patient was seen for WTC monitoring [unfilled] [Please See Note in Chart and Documentation in Trial DB] : Please see note in chart and documentation in Trial DB. 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[place] [facility] \par  Cardiologist: Dr Renteria \par had c cath 04 2017\par also has new PCP ; at [facility] [place]\par  has  cardiologist \par Dr Nadine Renteria \par had a stress test recently \par Occ Hx: works for [employer]\par PMH/PSH: HTN, Diabetes, CAD, PTCA in 2004, PCI 2016 \par Allergies: NKDA\par Meds: listed in allsript \par Soc Hx: \par Smoking Status: smoker 0.5 PPD since 1978 /44 years/ from 0.25 to 1 PPD , AMI 2004\par Review of Systems IAMQ reviewed with patient\par \par Physical Exam:\par VS: reviewed in Allscripts\par Documented in Trial DB\par \par \par \par  \par \par CXR: order LD CT chest \par Spirometry: worsening restriction \par \par A/P: WTC annual MV\par  CBC, CMP, lipids, UA ordered \par Smoking cessation recommended - declined referral to tobacco cessation program \par  LDCT chest ordered \par he USPSTF recommends annual screening for lung cancer with low-dose computed tomography (LDCT) in adults ages 50 to 80 years who have a 20 pack-year smoking history and currently smoke or have quit within the past 15 years. \par \par \par  \par  pt is not  interested  in smoking cessation program, \par  i had offered   referral to smoking cessation program, pt declined \par \par  refer to LDCT chest for lung cancer screening

## 2023-06-13 LAB
APPEARANCE: CLEAR
BACTERIA: NEGATIVE /HPF
BILIRUBIN URINE: NEGATIVE
BLOOD URINE: NEGATIVE
CAST: 0 /LPF
COLOR: YELLOW
EPITHELIAL CELLS: 0 /HPF
GLUCOSE QUALITATIVE U: >=1000 MG/DL
KETONES URINE: NEGATIVE MG/DL
LEUKOCYTE ESTERASE URINE: NEGATIVE
MICROSCOPIC-UA: NORMAL
NITRITE URINE: NEGATIVE
PH URINE: 6
PROTEIN URINE: NEGATIVE MG/DL
RED BLOOD CELLS URINE: 1 /HPF
SPECIFIC GRAVITY URINE: 1.03
UROBILINOGEN URINE: 0.2 MG/DL
WHITE BLOOD CELLS URINE: 0 /HPF

## 2023-07-12 ENCOUNTER — NON-APPOINTMENT (OUTPATIENT)
Age: 63
End: 2023-07-12

## 2023-07-12 VITALS — BODY MASS INDEX: 23.9 KG/M2 | WEIGHT: 140 LBS | HEIGHT: 64 IN

## 2023-07-12 DIAGNOSIS — F17.210 NICOTINE DEPENDENCE, CIGARETTES, UNCOMPLICATED: ICD-10-CM

## 2023-07-12 NOTE — HISTORY OF PRESENT ILLNESS
[Current] : Current [TextBox_13] : Patient is scheduled for a annual  LDCT for lung cancer screening.  Chart review performed to confirm eligibility for LDCT. \par \par  No documented personal or family history of lung cancer. No documented s/s of lung cancer.\par As per chart review patient is a current smoker with a 21 pack year hx. [PacksperYear] : 21

## 2023-07-13 ENCOUNTER — APPOINTMENT (OUTPATIENT)
Dept: CT IMAGING | Facility: HOSPITAL | Age: 63
End: 2023-07-13
Payer: COMMERCIAL

## 2023-07-13 ENCOUNTER — OUTPATIENT (OUTPATIENT)
Dept: OUTPATIENT SERVICES | Facility: HOSPITAL | Age: 63
LOS: 1 days | End: 2023-07-13
Payer: COMMERCIAL

## 2023-07-13 DIAGNOSIS — Z12.9 ENCOUNTER FOR SCREENING FOR MALIGNANT NEOPLASM, SITE UNSPECIFIED: ICD-10-CM

## 2023-07-13 DIAGNOSIS — Z98.890 OTHER SPECIFIED POSTPROCEDURAL STATES: Chronic | ICD-10-CM

## 2023-07-13 PROCEDURE — 71271 CT THORAX LUNG CANCER SCR C-: CPT

## 2023-07-13 PROCEDURE — 71271 CT THORAX LUNG CANCER SCR C-: CPT | Mod: 26

## 2024-02-22 NOTE — CHART NOTE - NSCHARTNOTESELECT_GEN_ALL_CORE
Pt being transported via EMS to psych facility. Pt belongings taken from locker 3 and given to EMS to go with patient. Pt mother at bedside and aware of transport. Pt calm and cooperative at transport. NAD noted. VSS.    Colonoscopy/Event Note

## 2024-07-21 ENCOUNTER — NON-APPOINTMENT (OUTPATIENT)
Age: 64
End: 2024-07-21

## 2024-07-22 ENCOUNTER — APPOINTMENT (OUTPATIENT)
Dept: SURGERY | Facility: CLINIC | Age: 64
End: 2024-07-22
Payer: COMMERCIAL

## 2024-07-22 VITALS
DIASTOLIC BLOOD PRESSURE: 91 MMHG | HEART RATE: 90 BPM | BODY MASS INDEX: 23.9 KG/M2 | HEIGHT: 64 IN | WEIGHT: 140 LBS | SYSTOLIC BLOOD PRESSURE: 147 MMHG

## 2024-07-22 DIAGNOSIS — E11.9 TYPE 2 DIABETES MELLITUS W/OUT COMPLICATIONS: ICD-10-CM

## 2024-07-22 DIAGNOSIS — Z78.9 OTHER SPECIFIED HEALTH STATUS: ICD-10-CM

## 2024-07-22 PROCEDURE — 12031 INTMD RPR S/A/T/EXT 2.5 CM/<: CPT

## 2024-07-22 PROCEDURE — 11422 EXC H-F-NK-SP B9+MARG 1.1-2: CPT

## 2024-07-22 PROCEDURE — 99203 OFFICE O/P NEW LOW 30 MIN: CPT | Mod: 25

## 2024-07-22 RX ORDER — EMPAGLIFLOZIN AND METFORMIN HYDROCHLORIDE 12.5; 1 MG/1; MG/1
TABLET ORAL
Refills: 0 | Status: ACTIVE | COMMUNITY

## 2024-07-22 RX ORDER — ROSUVASTATIN CALCIUM 5 MG/1
TABLET, FILM COATED ORAL
Refills: 0 | Status: ACTIVE | COMMUNITY

## 2024-07-22 NOTE — PHYSICAL EXAM
[No Rash or Lesion] : No rash or lesion [Alert] : alert [Oriented to Person] : oriented to person [Oriented to Place] : oriented to place [Oriented to Time] : oriented to time [Calm] : calm [de-identified] : A/Ox3; NAD. appears comfortable [de-identified] : EOMI; sclera anicteric. [de-identified] : palpable cyst left posterior neck approx 2 cm in size  [de-identified] : airway patent, no use of accessory muscles [de-identified] : abd is soft, NT/ND [de-identified] : +ROM, normal gait

## 2024-07-22 NOTE — CONSULT LETTER
[Dear  ___] : Dear  [unfilled], [Consult Closing:] : Thank you very much for allowing me to participate in the care of this patient.  If you have any questions, please do not hesitate to contact me. [FreeTextEntry3] : Trevor Herring MD

## 2024-07-22 NOTE — HISTORY OF PRESENT ILLNESS
[de-identified] : Mr. KATZ is a 64 year y/o M here for initial consultation visit w cc of having a small lump to the left side of posterior neck.  Pt states the lump is bothersome and he wants to have it removed, in the office.   PMH: CAD, hx of MI, S/P Cardiac Stent (2004- replaced 2016) on Brilinta and ASA 81mg; T2DM.

## 2024-07-22 NOTE — PLAN
[FreeTextEntry1] : pt advised to hold anticoagulant medications x 2 days prior to procedure however, he prefers to have the procedure done in the office today all of the options risks and benefits to the planned procedure discussed, including risk of bleeding given anticoagulant use. pt verbally expresses understanding and prefers to have procedure done today   Preop diagnosis: skin cyst, L posterior neck  Postop diagnosis: same as above Procedure:  excision of L posterior neck cyst   The area was cleaned with Betadine. The area was infiltrated with local, 1% lidocaine. The lesion was excised in an elliptical fashion. The area was closed in layers. Dressings placed. Patient tolerated the procedure well. Specimen sent for pathology after obtaining signed consent.  Postoperative instructions were given to the patient.  RTO for removal of sutures

## 2024-07-27 LAB — CORE LAB BIOPSY: NORMAL

## 2024-08-01 ENCOUNTER — APPOINTMENT (OUTPATIENT)
Dept: SURGERY | Facility: CLINIC | Age: 64
End: 2024-08-01
Payer: COMMERCIAL

## 2024-08-01 VITALS
OXYGEN SATURATION: 98 % | RESPIRATION RATE: 16 BRPM | HEIGHT: 64 IN | HEART RATE: 86 BPM | DIASTOLIC BLOOD PRESSURE: 84 MMHG | BODY MASS INDEX: 23.9 KG/M2 | SYSTOLIC BLOOD PRESSURE: 130 MMHG | WEIGHT: 140 LBS

## 2024-08-01 DIAGNOSIS — L72.9 FOLLICULAR CYST OF THE SKIN AND SUBCUTANEOUS TISSUE, UNSPECIFIED: ICD-10-CM

## 2024-08-01 PROCEDURE — 99024 POSTOP FOLLOW-UP VISIT: CPT

## 2024-08-01 NOTE — ASSESSMENT
[FreeTextEntry1] : IMP: 65 yo M with palpable cyst, left posterior neck. 2 x 2 cm in size.  S/P excision of left posterior neck mass, in the office, 07/23/24.  PATH- benign.

## 2024-08-01 NOTE — PLAN
[FreeTextEntry1] : sutures removed path results provided to the patient   follow up as needed  Patient's questions and concerns addressed to their satisfaction, and patient verbalized an understanding of the information discussed.

## 2024-08-01 NOTE — HISTORY OF PRESENT ILLNESS
[de-identified] : Mr. KATZ is a 64 year y/o M here for follow up visit, initially presented w cc of having a small lump to the left side of posterior neck.  Pt here for wound check, S/P excision of left posterior neck mass, in the office, 07/23/24.  Path is benign c/w epidermal cyst.  PMH: CAD, hx of MI, S/P Cardiac Stent (2004- replaced 2016) on Brilinta and ASA 81mg; T2DM.   Pt feels well. No reported complaints. No pain.

## 2024-08-01 NOTE — HISTORY OF PRESENT ILLNESS
[de-identified] : Mr. KATZ is a 64 year y/o M here for follow up visit, initially presented w cc of having a small lump to the left side of posterior neck.  Pt here for wound check, S/P excision of left posterior neck mass, in the office, 07/23/24.  Path is benign c/w epidermal cyst.  PMH: CAD, hx of MI, S/P Cardiac Stent (2004- replaced 2016) on Brilinta and ASA 81mg; T2DM.   Pt feels well. No reported complaints. No pain.

## 2024-08-01 NOTE — PHYSICAL EXAM
[No Rash or Lesion] : No rash or lesion [Alert] : alert [Oriented to Person] : oriented to person [Oriented to Place] : oriented to place [Oriented to Time] : oriented to time [Calm] : calm [de-identified] : A/Ox3; NAD. appears comfortable [de-identified] : EOMI; sclera anicteric. [de-identified] : incision site to L posterior neck is healing well, no evidence of acute inflammation or infection, sutures intact  [de-identified] : airway patent, no use of accessory muscles [de-identified] : abd is soft, NT/ND [de-identified] : +ROM, normal gait

## 2024-08-01 NOTE — HISTORY OF PRESENT ILLNESS
[de-identified] : Mr. KATZ is a 64 year y/o M here for follow up visit, initially presented w cc of having a small lump to the left side of posterior neck.  Pt here for wound check, S/P excision of left posterior neck mass, in the office, 07/23/24.  Path is benign c/w epidermal cyst.  PMH: CAD, hx of MI, S/P Cardiac Stent (2004- replaced 2016) on Brilinta and ASA 81mg; T2DM.   Pt feels well. No reported complaints. No pain.

## 2024-08-01 NOTE — REASON FOR VISIT
[Follow-Up: _____] : a [unfilled] follow-up visit [FreeTextEntry1] : S/P excision of left posterior neck mass, in the office, 07/23/24.

## 2024-08-01 NOTE — PHYSICAL EXAM
[No Rash or Lesion] : No rash or lesion [Alert] : alert [Oriented to Person] : oriented to person [Oriented to Place] : oriented to place [Oriented to Time] : oriented to time [Calm] : calm [de-identified] : A/Ox3; NAD. appears comfortable [de-identified] : EOMI; sclera anicteric. [de-identified] : incision site to L posterior neck is healing well, no evidence of acute inflammation or infection, sutures intact  [de-identified] : airway patent, no use of accessory muscles [de-identified] : abd is soft, NT/ND [de-identified] : +ROM, normal gait

## 2024-08-01 NOTE — ASSESSMENT
[FreeTextEntry1] : IMP: 63 yo M with palpable cyst, left posterior neck. 2 x 2 cm in size.  S/P excision of left posterior neck mass, in the office, 07/23/24.  PATH- benign.

## 2024-08-01 NOTE — PHYSICAL EXAM
[No Rash or Lesion] : No rash or lesion [Alert] : alert [Oriented to Person] : oriented to person [Oriented to Place] : oriented to place [Oriented to Time] : oriented to time [Calm] : calm [de-identified] : A/Ox3; NAD. appears comfortable [de-identified] : EOMI; sclera anicteric. [de-identified] : incision site to L posterior neck is healing well, no evidence of acute inflammation or infection, sutures intact  [de-identified] : airway patent, no use of accessory muscles [de-identified] : abd is soft, NT/ND [de-identified] : +ROM, normal gait

## 2024-08-09 ENCOUNTER — APPOINTMENT (OUTPATIENT)
Dept: OTHER | Facility: CLINIC | Age: 64
End: 2024-08-09

## 2024-08-09 PROCEDURE — 99396 PREV VISIT EST AGE 40-64: CPT | Mod: 25

## 2024-08-09 PROCEDURE — 94010 BREATHING CAPACITY TEST: CPT

## 2024-08-09 NOTE — DISCUSSION/SUMMARY
[Patient seen for WTC Monitoring ___] : Patient was seen for WTC monitoring [unfilled] [Please See Note in Chart and Documentation in Trial DB] : Please see note in chart and documentation in Trial DB. 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, [place] [facility]   Cardiologist: Dr Renteria  had c cath 04 2017 also has new PCP ; at [facility] [place]  has  cardiologist  Dr Nadine Renteria  had a stress test recently  Occ Hx: works for [employer] PMH/PSH: HTN, Diabetes, CAD, PTCA in 2004, PCI 2016  Allergies: NKDA Meds: listed in allsript  Soc Hx:  Smoking Status: smoker 0.5 PPD since 1978 /44 years/ from 0.25 to 1 PPD , AMI 2004 Review of Systems IAMQ reviewed with patient  Physical Exam: VS: reviewed in Allscripts Documented in Trial DB  spirometry; mild restriction , improved      CXR: order LD CT chest    A/P: [facility] annual MV  CBC, CMP, lipids, UA ordered  Smoking cessation recommended - declined referral to tobacco cessation program   LDCT chest ordered  he USPSTF recommends annual screening for lung cancer with low-dose computed tomography (LDCT) in adults ages 50 to 80 years who have a 20 pack-year smoking history and currently smoke or have quit within the past 15 years.       pt is not  interested  in smoking cessation program,   i had offered   referral to smoking cessation program, pt declined    refer to LDCT chest for lung cancer screening

## 2024-08-20 ENCOUNTER — NON-APPOINTMENT (OUTPATIENT)
Age: 64
End: 2024-08-20

## 2024-08-21 VITALS — HEIGHT: 64 IN | WEIGHT: 140 LBS | BODY MASS INDEX: 23.9 KG/M2

## 2024-08-21 DIAGNOSIS — F17.200 NICOTINE DEPENDENCE, UNSPECIFIED, UNCOMPLICATED: ICD-10-CM

## 2024-08-21 NOTE — HISTORY OF PRESENT ILLNESS
[Current] : Current [TextBox_13] : Patient is scheduled for an annual  LDCT for lung cancer screening.  Chart review performed to confirm eligibility for LDCT.    No documented personal or family history of lung cancer. No documented s/s of lung cancer. As per chart review patient is a current smoker with a 21 pack year hx. [PacksperYear] : 21

## 2024-08-26 ENCOUNTER — OUTPATIENT (OUTPATIENT)
Dept: OUTPATIENT SERVICES | Facility: HOSPITAL | Age: 64
LOS: 1 days | End: 2024-08-26
Payer: COMMERCIAL

## 2024-08-26 ENCOUNTER — APPOINTMENT (OUTPATIENT)
Dept: CT IMAGING | Facility: HOSPITAL | Age: 64
End: 2024-08-26

## 2024-08-26 DIAGNOSIS — Z12.9 ENCOUNTER FOR SCREENING FOR MALIGNANT NEOPLASM, SITE UNSPECIFIED: ICD-10-CM

## 2024-08-26 DIAGNOSIS — Z98.890 OTHER SPECIFIED POSTPROCEDURAL STATES: Chronic | ICD-10-CM

## 2024-08-26 PROCEDURE — 71271 CT THORAX LUNG CANCER SCR C-: CPT

## 2024-08-26 PROCEDURE — 71271 CT THORAX LUNG CANCER SCR C-: CPT | Mod: 26

## 2024-11-28 NOTE — H&P ADULT - GASTROINTESTINAL
Goal Outcome Evaluation:   Pt DC home.                                          negative Soft, non-tender, no hepatosplenomegaly, normal bowel sounds

## 2025-08-26 ENCOUNTER — NON-APPOINTMENT (OUTPATIENT)
Age: 65
End: 2025-08-26

## 2025-08-27 ENCOUNTER — APPOINTMENT (OUTPATIENT)
Dept: OTHER | Facility: CLINIC | Age: 65
End: 2025-08-27
Payer: COMMERCIAL

## 2025-08-27 ENCOUNTER — NON-APPOINTMENT (OUTPATIENT)
Age: 65
End: 2025-08-27

## 2025-08-27 VITALS
SYSTOLIC BLOOD PRESSURE: 167 MMHG | DIASTOLIC BLOOD PRESSURE: 97 MMHG | HEART RATE: 72 BPM | HEIGHT: 64 IN | OXYGEN SATURATION: 99 % | RESPIRATION RATE: 16 BRPM | BODY MASS INDEX: 24.41 KG/M2 | WEIGHT: 143 LBS | TEMPERATURE: 98.3 F

## 2025-08-27 VITALS — SYSTOLIC BLOOD PRESSURE: 128 MMHG | DIASTOLIC BLOOD PRESSURE: 80 MMHG

## 2025-08-27 VITALS — HEIGHT: 64 IN | WEIGHT: 143 LBS | BODY MASS INDEX: 24.41 KG/M2

## 2025-08-27 DIAGNOSIS — Z12.9 ENCOUNTER FOR SCREENING FOR MALIGNANT NEOPLASM, SITE UNSPECIFIED: ICD-10-CM

## 2025-08-27 DIAGNOSIS — F17.210 NICOTINE DEPENDENCE, CIGARETTES, UNCOMPLICATED: ICD-10-CM

## 2025-08-27 DIAGNOSIS — Z04.9 ENCOUNTER FOR EXAMINATION AND OBSERVATION FOR UNSPECIFIED REASON: ICD-10-CM

## 2025-08-27 DIAGNOSIS — Z72.0 TOBACCO USE: ICD-10-CM

## 2025-08-27 PROCEDURE — 94010 BREATHING CAPACITY TEST: CPT

## 2025-08-27 PROCEDURE — 99396 PREV VISIT EST AGE 40-64: CPT | Mod: 25

## 2025-08-27 RX ORDER — NICOTINE 21 MG/24HR
14 PATCH, TRANSDERMAL 24 HOURS TRANSDERMAL DAILY
Qty: 6 | Refills: 0 | Status: ACTIVE | COMMUNITY
Start: 2025-08-27 | End: 1900-01-01

## 2025-08-27 RX ORDER — LOSARTAN POTASSIUM 100 MG/1
TABLET, FILM COATED ORAL
Refills: 0 | Status: ACTIVE | COMMUNITY

## 2025-09-19 ENCOUNTER — APPOINTMENT (OUTPATIENT)
Dept: CT IMAGING | Facility: CLINIC | Age: 65
End: 2025-09-19